# Patient Record
Sex: FEMALE | Race: WHITE | NOT HISPANIC OR LATINO | URBAN - METROPOLITAN AREA
[De-identification: names, ages, dates, MRNs, and addresses within clinical notes are randomized per-mention and may not be internally consistent; named-entity substitution may affect disease eponyms.]

---

## 2020-07-23 ENCOUNTER — OFFICE VISIT (OUTPATIENT)
Dept: URBAN - METROPOLITAN AREA CLINIC 72 | Facility: CLINIC | Age: 70
End: 2020-07-23

## 2020-07-25 ENCOUNTER — TELEPHONE ENCOUNTER (OUTPATIENT)
Dept: URBAN - METROPOLITAN AREA CLINIC 13 | Facility: CLINIC | Age: 70
End: 2020-07-25

## 2020-07-25 RX ORDER — CHOLECALCIFEROL (VITAMIN D3) 125 MCG
TAKE 1 TABLET DAILY TABLET ORAL
Refills: 0 | OUTPATIENT
End: 2020-04-16

## 2020-07-25 RX ORDER — VALSARTAN AND HYDROCHLOROTHIAZIDE 160; 25 MG/1; MG/1
TAKE 1 TABLET DAILY TABLET, FILM COATED ORAL
Refills: 0 | OUTPATIENT
End: 2020-04-16

## 2020-07-25 RX ORDER — CALCIUM CITRATE/VITAMIN D3 315MG-6.25
TAKE 1 TABLET DAILY TABLET ORAL
Refills: 0 | OUTPATIENT
Start: 2006-02-10 | End: 2008-12-01

## 2020-07-25 RX ORDER — BIFIDOBACTERIUM LONGUM 10MM CELL
USE AS DIRECTED CAPSULE ORAL
Refills: 0 | OUTPATIENT
Start: 2009-08-03 | End: 2011-05-02

## 2020-07-25 RX ORDER — FLUCONAZOLE 150 MG/1
ONE TABLET TODAY; REPEAT IN 3 DAYS TABLET ORAL
Qty: 2 | Refills: 0 | OUTPATIENT
Start: 2017-12-06 | End: 2019-05-06

## 2020-07-25 RX ORDER — HYDROCHLOROTHIAZIDE 12.5 MG/1
TAKE 1 CAPSULE DAILY CAPSULE ORAL
Refills: 0 | OUTPATIENT
End: 2017-03-06

## 2020-07-25 RX ORDER — PAROXETINE HYDROCHLORIDE 37.5 MG/1
TAKE 1 TABLET DAILY TABLET, FILM COATED, EXTENDED RELEASE ORAL
Refills: 0 | OUTPATIENT
End: 2019-03-06

## 2020-07-25 RX ORDER — BIOTIN 2500 MCG
TAKE 1 CAPSULE DAILY CAPSULE ORAL
Refills: 0 | OUTPATIENT
End: 2017-06-15

## 2020-07-25 RX ORDER — VALSARTAN 80 MG/1
TAKE ONE TABLET BY MOUTH ONE TIME DAILY TABLET, FILM COATED ORAL
Qty: 30 | Refills: 0 | OUTPATIENT
Start: 2019-09-24 | End: 2020-04-16

## 2020-07-25 RX ORDER — METRONIDAZOLE 500 MG/1
TAKE 1 TABLET 3 TIMES DAILY TABLET ORAL
Qty: 21 | Refills: 0 | OUTPATIENT
Start: 2017-11-15 | End: 2018-07-17

## 2020-07-25 RX ORDER — PANTOPRAZOLE SODIUM 40 MG/1
TAKE ONE TABLET BY MOUTH EVERY DAY TABLET, DELAYED RELEASE ORAL
Qty: 90 | Refills: 3 | OUTPATIENT
Start: 2016-01-04 | End: 2017-03-06

## 2020-07-25 RX ORDER — PANTOPRAZOLE SODIUM 40 MG/1
TAKE 1 TABLET 30 MINUTES BEFORE BREAKFAST DAILY TABLET, DELAYED RELEASE ORAL
Qty: 30 | Refills: 6 | OUTPATIENT
Start: 2015-10-28 | End: 2016-07-26

## 2020-07-25 RX ORDER — POLYETHYLENE GLYCOL 3350, SODIUM CHLORIDE, SODIUM BICARBONATE AND POTASSIUM CHLORIDE WITH LEMON FLAVOR 420; 11.2; 5.72; 1.48 G/4L; G/4L; G/4L; G/4L
TAKE  ML AS DIRECTED MIX CONTENTS PER DIRECTIONS, BEGIN DRINKING 1/2 SOLUTION @ 5P, COMPLETE REMAINDER OF SOLUTION 6HR PRIOR TO PROCEDURE POWDER, FOR SOLUTION ORAL
Qty: 1 | Refills: 0 | OUTPATIENT
Start: 2014-08-29 | End: 2014-10-03

## 2020-07-25 RX ORDER — CYANOCOBALAMIN (VITAMIN B-12) 500 MCG
TAKE 1 TABLET TWICE DAILY LOZENGE ORAL
Qty: 60 | Refills: 5 | OUTPATIENT
Start: 2017-06-16 | End: 2020-04-16

## 2020-07-25 RX ORDER — CIPROFLOXACIN HYDROCHLORIDE 500 MG/1
TAKE 1 TABLET TWICE DAILY TABLET, FILM COATED ORAL
Qty: 14 | Refills: 0 | OUTPATIENT
Start: 2017-11-15 | End: 2017-12-06

## 2020-07-26 ENCOUNTER — TELEPHONE ENCOUNTER (OUTPATIENT)
Dept: URBAN - METROPOLITAN AREA CLINIC 13 | Facility: CLINIC | Age: 70
End: 2020-07-26

## 2020-07-26 RX ORDER — ACETAMINOPHEN 325 MG
TAKE 1 CAPSULE DAILY TABLET ORAL
Refills: 0 | Status: ACTIVE | COMMUNITY

## 2020-07-26 RX ORDER — MULTIVITAMIN WITH IRON
TAKE 1 TABLET DAILY TABLET ORAL
Refills: 0 | Status: ACTIVE | COMMUNITY

## 2020-07-26 RX ORDER — PAROXETINE HYDROCHLORIDE HEMIHYDRATE 20 MG/1
TAKE ONE TABLET BY MOUTH EVERY MORNING TABLET, FILM COATED ORAL
Qty: 90 | Refills: 0 | Status: ACTIVE | COMMUNITY
Start: 2019-02-28

## 2020-07-26 RX ORDER — HYDROCHLOROTHIAZIDE 50 MG/1
TAKE 1 TABLET DAILY TABLET ORAL
Refills: 0 | Status: ACTIVE | COMMUNITY
Start: 2019-03-15

## 2020-07-26 RX ORDER — PAROXETINE HYDROCHLORIDE 40 MG/1
TAKE ONE TABLET BY MOUTH ONE TIME DAILY TABLET, FILM COATED ORAL
Qty: 90 | Refills: 0 | Status: ACTIVE | COMMUNITY
Start: 2019-03-14

## 2020-07-26 RX ORDER — TRIAMCINOLONE ACETONIDE 1 MG/G
APPLY TO THE AFFECTED AREA(S) TOPICALLY TWICE DAILY CREAM TOPICAL
Qty: 45 | Refills: 0 | Status: ACTIVE | COMMUNITY
Start: 2019-04-11

## 2020-07-26 RX ORDER — PANTOPRAZOLE SODIUM 40 MG/1
TAKE 1 TABLET BY MOUTH EVERY DAY TABLET, DELAYED RELEASE ORAL
Qty: 90 | Refills: 3 | Status: ACTIVE | COMMUNITY
Start: 2016-07-26

## 2020-07-26 RX ORDER — MELOXICAM 15 MG/1
TABLET ORAL
Qty: 90 | Refills: 0 | Status: ACTIVE | COMMUNITY
Start: 2020-07-10

## 2020-07-26 RX ORDER — AZITHROMYCIN DIHYDRATE 250 MG/1
TABLET, FILM COATED ORAL
Qty: 6 | Refills: 0 | Status: ACTIVE | COMMUNITY
Start: 2014-08-18

## 2020-07-26 RX ORDER — LEVOTHYROXINE SODIUM 25 UG/1
TAKE 1 TABLET DAILY TABLET ORAL
Refills: 0 | Status: ACTIVE | COMMUNITY
Start: 2019-03-15

## 2020-07-26 RX ORDER — CIPROFLOXACIN AND DEXAMETHASONE 3; 1 MG/ML; MG/ML
SUSPENSION/ DROPS AURICULAR (OTIC)
Qty: 8 | Refills: 0 | Status: ACTIVE | COMMUNITY
Start: 2019-07-25

## 2020-07-26 RX ORDER — BIFIDOBACTERIUM LONGUM 10MM CELL
USE AS DIRECTED CAPSULE ORAL
Refills: 0 | Status: ACTIVE | COMMUNITY

## 2020-07-26 RX ORDER — PAROXETINE HYDROCHLORIDE 30 MG/1
TABLET ORAL
Qty: 90 | Refills: 0 | Status: ACTIVE | COMMUNITY
Start: 2020-03-24

## 2020-07-26 RX ORDER — AMOXICILLIN 500 MG/1
TAKE TWO CAPSULES BY MOUTH NOW, THEN TAKE ONE CAPSULE BY MOUTH THREE T CAPSULE ORAL
Qty: 30 | Refills: 0 | Status: ACTIVE | COMMUNITY
Start: 2019-11-04

## 2020-07-26 RX ORDER — METOPROLOL SUCCINATE 25 MG/1
TAKE 1 TABLET ONCE DAILY TABLET, EXTENDED RELEASE ORAL
Refills: 0 | Status: ACTIVE | COMMUNITY

## 2020-07-26 RX ORDER — AMOXICILLIN 875 MG/1
TAKE ONE TABLET BY MOUTH TWICE A DAY UNTIL GONE TABLET, FILM COATED ORAL
Qty: 14 | Refills: 0 | Status: ACTIVE | COMMUNITY
Start: 2018-09-26

## 2020-07-26 RX ORDER — MUPIROCIN 20 MG/G
APPLY TO THE AFFECTED AREA(S) OF EROSIONS TOPICALLY TWICE DAILY UNTIL OINTMENT TOPICAL
Qty: 22 | Refills: 0 | Status: ACTIVE | COMMUNITY
Start: 2019-08-08

## 2020-07-26 RX ORDER — ETODOLAC 400 MG/1
TAKE ONE TABLET BY MOUTH THREE TIMES A DAY AS NEEDED FOR PAIN TABLET, COATED ORAL
Qty: 12 | Refills: 0 | Status: ACTIVE | COMMUNITY
Start: 2019-11-04

## 2020-07-26 RX ORDER — PRAVASTATIN SODIUM 20 MG/1
TAKE 1 TABLET DAILY TABLET ORAL
Refills: 0 | Status: ACTIVE | COMMUNITY

## 2020-07-26 RX ORDER — CIPROFLOXACIN AND DEXAMETHASONE 3; 1 MG/ML; MG/ML
INSTILL 4 DROPS IN THE LEFT EAR TWICE A DAY FOR 7 DAYS SUSPENSION/ DROPS AURICULAR (OTIC)
Qty: 8 | Refills: 0 | Status: ACTIVE | COMMUNITY
Start: 2019-07-24

## 2020-07-26 RX ORDER — PAROXETINE HYDROCHLORIDE 30 MG/1
TAKE ONE TABLET BY MOUTH EVERY MORNING TABLET ORAL
Qty: 90 | Refills: 0 | Status: ACTIVE | COMMUNITY
Start: 2019-09-24

## 2020-07-26 RX ORDER — CALCIUM CITRATE/VITAMIN D3 315MG-6.25
TABLET ORAL
Refills: 0 | Status: ACTIVE | COMMUNITY

## 2020-07-26 RX ORDER — ALPRAZOLAM 2 MG/1
TAKE 1 TABLET DAILY PRN TABLET ORAL
Refills: 0 | Status: ACTIVE | COMMUNITY
Start: 2006-02-10

## 2020-07-26 RX ORDER — DOXYCYCLINE 100 MG/1
TAKE ONE TABLET BY MOUTH TWICE A DAY FOR 12 DAYS TABLET, FILM COATED ORAL
Qty: 24 | Refills: 0 | Status: ACTIVE | COMMUNITY
Start: 2019-08-08

## 2020-07-26 RX ORDER — PAROXETINE HYDROCHLORIDE 40 MG/1
TABLET, FILM COATED ORAL
Qty: 90 | Refills: 0 | Status: ACTIVE | COMMUNITY
Start: 2019-05-07

## 2020-08-25 ENCOUNTER — OFFICE VISIT (OUTPATIENT)
Dept: URBAN - METROPOLITAN AREA CLINIC 72 | Facility: CLINIC | Age: 70
End: 2020-08-25
Payer: MEDICARE

## 2020-08-25 ENCOUNTER — WEB ENCOUNTER (OUTPATIENT)
Dept: URBAN - METROPOLITAN AREA CLINIC 72 | Facility: CLINIC | Age: 70
End: 2020-08-25

## 2020-08-25 VITALS
WEIGHT: 174 LBS | HEART RATE: 73 BPM | BODY MASS INDEX: 30.83 KG/M2 | HEIGHT: 63 IN | TEMPERATURE: 98.4 F | DIASTOLIC BLOOD PRESSURE: 86 MMHG | SYSTOLIC BLOOD PRESSURE: 141 MMHG

## 2020-08-25 DIAGNOSIS — Z86.010 HISTORY OF COLON POLYPS: ICD-10-CM

## 2020-08-25 DIAGNOSIS — K22.70 BARRETT'S ESOPHAGUS WITHOUT DYSPLASIA: ICD-10-CM

## 2020-08-25 DIAGNOSIS — K64.2 GRADE III HEMORRHOIDS: ICD-10-CM

## 2020-08-25 PROCEDURE — G8938 BMI DOC ONL FUP NT DOC: HCPCS | Performed by: INTERNAL MEDICINE

## 2020-08-25 PROCEDURE — 99213 OFFICE O/P EST LOW 20 MIN: CPT | Performed by: INTERNAL MEDICINE

## 2020-08-25 PROCEDURE — 3017F COLORECTAL CA SCREEN DOC REV: CPT | Performed by: INTERNAL MEDICINE

## 2020-08-25 PROCEDURE — 1036F TOBACCO NON-USER: CPT | Performed by: INTERNAL MEDICINE

## 2020-08-25 PROCEDURE — G8427 DOCREV CUR MEDS BY ELIG CLIN: HCPCS | Performed by: INTERNAL MEDICINE

## 2020-08-25 RX ORDER — MULTIVITAMIN WITH IRON
TAKE 1 TABLET DAILY TABLET ORAL
Refills: 0 | Status: ACTIVE | COMMUNITY

## 2020-08-25 RX ORDER — PAROXETINE HYDROCHLORIDE 40 MG/1
TAKE ONE TABLET BY MOUTH ONE TIME DAILY TABLET, FILM COATED ORAL
Qty: 90 | Refills: 0 | Status: ON HOLD | COMMUNITY
Start: 2019-03-14

## 2020-08-25 RX ORDER — AMOXICILLIN 875 MG/1
TAKE ONE TABLET BY MOUTH TWICE A DAY UNTIL GONE TABLET, FILM COATED ORAL
Qty: 14 | Refills: 0 | Status: ON HOLD | COMMUNITY
Start: 2018-09-26

## 2020-08-25 RX ORDER — PAROXETINE HYDROCHLORIDE 30 MG/1
TAKE ONE TABLET BY MOUTH EVERY MORNING TABLET ORAL
Qty: 90 | Refills: 0 | Status: ACTIVE | COMMUNITY
Start: 2019-09-24

## 2020-08-25 RX ORDER — ETODOLAC 400 MG/1
TAKE ONE TABLET BY MOUTH THREE TIMES A DAY AS NEEDED FOR PAIN TABLET, COATED ORAL
Qty: 12 | Refills: 0 | Status: ON HOLD | COMMUNITY
Start: 2019-11-04

## 2020-08-25 RX ORDER — MELOXICAM 15 MG/1
TABLET ORAL
Qty: 90 | Refills: 0 | Status: ACTIVE | COMMUNITY
Start: 2020-07-10

## 2020-08-25 RX ORDER — CALCIUM CITRATE/VITAMIN D3 315MG-6.25
TABLET ORAL
Refills: 0 | Status: ACTIVE | COMMUNITY

## 2020-08-25 RX ORDER — LEVOTHYROXINE SODIUM 25 UG/1
TAKE 1 TABLET DAILY TABLET ORAL
Refills: 0 | Status: ACTIVE | COMMUNITY
Start: 2019-03-15

## 2020-08-25 RX ORDER — ACETAMINOPHEN 325 MG
TAKE 1 CAPSULE DAILY TABLET ORAL
Refills: 0 | Status: ACTIVE | COMMUNITY

## 2020-08-25 RX ORDER — PANTOPRAZOLE SODIUM 40 MG/1
TAKE 1 TABLET BY MOUTH EVERY DAY TABLET, DELAYED RELEASE ORAL
Qty: 90 | Refills: 3 | Status: ACTIVE | COMMUNITY
Start: 2016-07-26

## 2020-08-25 RX ORDER — PAROXETINE HYDROCHLORIDE HEMIHYDRATE 20 MG/1
TAKE ONE TABLET BY MOUTH EVERY MORNING TABLET, FILM COATED ORAL
Qty: 90 | Refills: 0 | Status: ON HOLD | COMMUNITY
Start: 2019-02-28

## 2020-08-25 RX ORDER — DOXYCYCLINE 100 MG/1
TAKE ONE TABLET BY MOUTH TWICE A DAY FOR 12 DAYS TABLET, FILM COATED ORAL
Qty: 24 | Refills: 0 | Status: ON HOLD | COMMUNITY
Start: 2019-08-08

## 2020-08-25 RX ORDER — TRIAMCINOLONE ACETONIDE 1 MG/G
APPLY TO THE AFFECTED AREA(S) TOPICALLY TWICE DAILY CREAM TOPICAL
Qty: 45 | Refills: 0 | Status: ON HOLD | COMMUNITY
Start: 2019-04-11

## 2020-08-25 RX ORDER — METOPROLOL SUCCINATE 25 MG/1
TAKE 1 TABLET ONCE DAILY TABLET, EXTENDED RELEASE ORAL
Refills: 0 | Status: ACTIVE | COMMUNITY

## 2020-08-25 RX ORDER — HYDROCHLOROTHIAZIDE 50 MG/1
TAKE 1 TABLET DAILY TABLET ORAL
Refills: 0 | Status: ACTIVE | COMMUNITY
Start: 2019-03-15

## 2020-08-25 RX ORDER — AMOXICILLIN 500 MG/1
TAKE TWO CAPSULES BY MOUTH NOW, THEN TAKE ONE CAPSULE BY MOUTH THREE T CAPSULE ORAL
Qty: 30 | Refills: 0 | Status: ON HOLD | COMMUNITY
Start: 2019-11-04

## 2020-08-25 RX ORDER — CIPROFLOXACIN AND DEXAMETHASONE 3; 1 MG/ML; MG/ML
INSTILL 4 DROPS IN THE LEFT EAR TWICE A DAY FOR 7 DAYS SUSPENSION/ DROPS AURICULAR (OTIC)
Qty: 8 | Refills: 0 | Status: ACTIVE | COMMUNITY
Start: 2019-07-24

## 2020-08-25 RX ORDER — AZITHROMYCIN DIHYDRATE 250 MG/1
TABLET, FILM COATED ORAL
Qty: 6 | Refills: 0 | Status: ON HOLD | COMMUNITY
Start: 2014-08-18

## 2020-08-25 RX ORDER — MUPIROCIN 20 MG/G
APPLY TO THE AFFECTED AREA(S) OF EROSIONS TOPICALLY TWICE DAILY UNTIL OINTMENT TOPICAL
Qty: 22 | Refills: 0 | Status: ACTIVE | COMMUNITY
Start: 2019-08-08

## 2020-08-25 RX ORDER — PRAVASTATIN SODIUM 20 MG/1
TAKE 1 TABLET DAILY TABLET ORAL
Refills: 0 | Status: ACTIVE | COMMUNITY

## 2020-08-25 RX ORDER — BIFIDOBACTERIUM LONGUM 10MM CELL
USE AS DIRECTED CAPSULE ORAL
Refills: 0 | Status: ON HOLD | COMMUNITY

## 2020-08-25 RX ORDER — ALPRAZOLAM 2 MG/1
TAKE 1 TABLET DAILY PRN TABLET ORAL
Refills: 0 | Status: ACTIVE | COMMUNITY
Start: 2006-02-10

## 2020-08-25 NOTE — PHYSICAL EXAM GASTROINTESTINAL
Abdomen , soft, nontender, nondistended , no guarding or rigidity , no masses palpable , normal bowel sounds , Liver and Spleen , no hepatomegaly present , no hepatosplenomegaly , liver nontender , spleen not palpable perianal exam with reabsorbing external hemorrhoid and numerous skin tags, no obvious bleeding. Claudia Lucia MA was present as assistant.

## 2020-08-25 NOTE — HPI-TODAY'S VISIT:
Ms. Mcguire returns for follow-up.  Recall that she was last seen in our office on 7/23/2020.  She underwent hemorrhoid band ligation, she had her left lateral hemorrhoid banded, she had undergone banding of the other hemorrhoids previously.  She will be due for EGD and colonoscopy in October due to her history of Squires's esophagus and colon polyps.  We advised conservative management after banding with suppositories as needed and increasing her dietary fiber. She has been doing well since we last banded her but notes that she feels an external hemorrhoid has come up, recall that at last visit she reported that she had a small external hemorrhoid that was nonpainful, she has been using over-the-counter treatment including Desitin and Tucks pads to the area but feels that it has enlarged.

## 2021-02-02 ENCOUNTER — TELEPHONE ENCOUNTER (OUTPATIENT)
Dept: URBAN - METROPOLITAN AREA CLINIC 113 | Facility: CLINIC | Age: 71
End: 2021-02-02

## 2021-02-09 ENCOUNTER — OFFICE VISIT (OUTPATIENT)
Dept: URBAN - METROPOLITAN AREA CLINIC 107 | Facility: CLINIC | Age: 71
End: 2021-02-09

## 2021-02-09 NOTE — HPI-TODAY'S VISIT:
71-year-old with history gastroesophageal reflux disease, nondysplastic short segment Squires's esophagus, functional dyspepsia, abnormal liver function tests secondary to nonalcoholic fatty liver disease, chronic constipation.  Last EGD on 10/31/2017 revealed Squires's esophagus one island from 37-38 cm, some patchy erythema the gastric antrum, multiple 3-6 mm sessile polyps in the gastric body and normal duodenum.  Biopsies revealed squamocolumnar mucosa with reflux type changes without intestinal metaplasia or dysplasia, polypoid foveolar hyperplasia, no H. pylori.  Last colonoscopy was on 11/15/2017 that revealed a normal terminal ileum, grade 2 internal hemorrhoids, many small and large mouth diverticuli of the left and transverse colon.  There were 2 polyps in the ascending colon each 3 mm in size and a 6 mm polyp in the descending colon.  There is localized area of moderate erythema in the sigmoid colon biopsies were obtained.  There is a small amount of possible this was nodular and erythematous and adjacent to a diverticulum.  There was a single large diverticulum found in the sigmoid colon with shahid-diverticula erythema, discharge was seen in association with the diverticular opening suspicion for diverticulitis.  The ascending colon polyps were tubular adenomas and the sigmoid colon biopsy was diverticular disease associated with colitis.  She has subsequently had hemorrhoidal banding by Dr. Díaz.

## 2021-02-10 ENCOUNTER — OFFICE VISIT (OUTPATIENT)
Dept: URBAN - METROPOLITAN AREA CLINIC 113 | Facility: CLINIC | Age: 71
End: 2021-02-10
Payer: MEDICARE

## 2021-02-10 ENCOUNTER — LAB OUTSIDE AN ENCOUNTER (OUTPATIENT)
Dept: URBAN - METROPOLITAN AREA CLINIC 113 | Facility: CLINIC | Age: 71
End: 2021-02-10

## 2021-02-10 VITALS
BODY MASS INDEX: 30.12 KG/M2 | SYSTOLIC BLOOD PRESSURE: 119 MMHG | TEMPERATURE: 97.8 F | HEART RATE: 85 BPM | DIASTOLIC BLOOD PRESSURE: 80 MMHG | HEIGHT: 63 IN | WEIGHT: 170 LBS

## 2021-02-10 DIAGNOSIS — K22.70 BARRETT'S ESOPHAGUS WITHOUT DYSPLASIA: ICD-10-CM

## 2021-02-10 DIAGNOSIS — D12.6 TUBULAR ADENOMA OF COLON: ICD-10-CM

## 2021-02-10 PROCEDURE — 99213 OFFICE O/P EST LOW 20 MIN: CPT | Performed by: INTERNAL MEDICINE

## 2021-02-10 PROCEDURE — G8482 FLU IMMUNIZE ORDER/ADMIN: HCPCS | Performed by: INTERNAL MEDICINE

## 2021-02-10 PROCEDURE — G9903 PT SCRN TBCO ID AS NON USER: HCPCS | Performed by: INTERNAL MEDICINE

## 2021-02-10 PROCEDURE — 3017F COLORECTAL CA SCREEN DOC REV: CPT | Performed by: INTERNAL MEDICINE

## 2021-02-10 PROCEDURE — G8427 DOCREV CUR MEDS BY ELIG CLIN: HCPCS | Performed by: INTERNAL MEDICINE

## 2021-02-10 RX ORDER — PAROXETINE HYDROCHLORIDE HEMIHYDRATE 20 MG/1
TAKE ONE TABLET BY MOUTH EVERY MORNING TABLET, FILM COATED ORAL
Qty: 90 | Refills: 0 | Status: ON HOLD | COMMUNITY
Start: 2019-02-28

## 2021-02-10 RX ORDER — ETODOLAC 400 MG/1
TAKE ONE TABLET BY MOUTH THREE TIMES A DAY AS NEEDED FOR PAIN TABLET, COATED ORAL
Qty: 12 | Refills: 0 | Status: ON HOLD | COMMUNITY
Start: 2019-11-04

## 2021-02-10 RX ORDER — ACETAMINOPHEN 325 MG
TAKE 1 CAPSULE DAILY TABLET ORAL
Refills: 0 | Status: DISCONTINUED | COMMUNITY

## 2021-02-10 RX ORDER — ALPRAZOLAM 2 MG/1
TAKE 1 TABLET DAILY PRN TABLET ORAL
Refills: 0 | Status: ACTIVE | COMMUNITY
Start: 2006-02-10

## 2021-02-10 RX ORDER — DOXYCYCLINE 100 MG/1
TAKE ONE TABLET BY MOUTH TWICE A DAY FOR 12 DAYS TABLET, FILM COATED ORAL
Qty: 24 | Refills: 0 | Status: ON HOLD | COMMUNITY
Start: 2019-08-08

## 2021-02-10 RX ORDER — MUPIROCIN 20 MG/G
APPLY TO THE AFFECTED AREA(S) OF EROSIONS TOPICALLY TWICE DAILY UNTIL OINTMENT TOPICAL
Qty: 22 | Refills: 0 | Status: ON HOLD | COMMUNITY
Start: 2019-08-08

## 2021-02-10 RX ORDER — VALSARTAN 80 MG/1
1 TABLET TABLET, FILM COATED ORAL ONCE A DAY
Status: ACTIVE | COMMUNITY

## 2021-02-10 RX ORDER — TRIAMCINOLONE ACETONIDE 1 MG/G
APPLY TO THE AFFECTED AREA(S) TOPICALLY TWICE DAILY CREAM TOPICAL
Qty: 45 | Refills: 0 | Status: ON HOLD | COMMUNITY
Start: 2019-04-11

## 2021-02-10 RX ORDER — PANTOPRAZOLE SODIUM 40 MG/1
TAKE 1 TABLET BY MOUTH EVERY DAY TABLET, DELAYED RELEASE ORAL
Qty: 90 | Refills: 3 | Status: ACTIVE | COMMUNITY
Start: 2016-07-26

## 2021-02-10 RX ORDER — PRAVASTATIN SODIUM 20 MG/1
TAKE 1 TABLET DAILY TABLET ORAL
Refills: 0 | Status: ON HOLD | COMMUNITY

## 2021-02-10 RX ORDER — MELOXICAM 15 MG/1
TABLET ORAL
Qty: 90 | Refills: 0 | Status: ON HOLD | COMMUNITY
Start: 2020-07-10

## 2021-02-10 RX ORDER — BIFIDOBACTERIUM LONGUM 10MM CELL
USE AS DIRECTED CAPSULE ORAL
Refills: 0 | Status: ON HOLD | COMMUNITY

## 2021-02-10 RX ORDER — LEVOTHYROXINE SODIUM 25 UG/1
TAKE 1 TABLET DAILY TABLET ORAL
Refills: 0 | Status: ACTIVE | COMMUNITY
Start: 2019-03-15

## 2021-02-10 RX ORDER — MULTIVITAMIN WITH IRON
TAKE 1 TABLET DAILY TABLET ORAL
Refills: 0 | Status: ACTIVE | COMMUNITY

## 2021-02-10 RX ORDER — CALCIUM CITRATE/VITAMIN D3 315MG-6.25
TABLET ORAL
Refills: 0 | Status: ON HOLD | COMMUNITY

## 2021-02-10 RX ORDER — HYDROCHLOROTHIAZIDE 50 MG/1
TAKE 1 TABLET DAILY TABLET ORAL
Refills: 0 | Status: ACTIVE | COMMUNITY
Start: 2019-03-15

## 2021-02-10 RX ORDER — AMOXICILLIN 875 MG/1
TAKE ONE TABLET BY MOUTH TWICE A DAY UNTIL GONE TABLET, FILM COATED ORAL
Qty: 14 | Refills: 0 | Status: ON HOLD | COMMUNITY
Start: 2018-09-26

## 2021-02-10 RX ORDER — CIPROFLOXACIN AND DEXAMETHASONE 3; 1 MG/ML; MG/ML
INSTILL 4 DROPS IN THE LEFT EAR TWICE A DAY FOR 7 DAYS SUSPENSION/ DROPS AURICULAR (OTIC)
Qty: 8 | Refills: 0 | Status: ON HOLD | COMMUNITY
Start: 2019-07-24

## 2021-02-10 RX ORDER — PAROXETINE HYDROCHLORIDE 30 MG/1
TAKE ONE TABLET BY MOUTH EVERY MORNING TABLET ORAL
Qty: 90 | Refills: 0 | Status: ACTIVE | COMMUNITY
Start: 2019-09-24

## 2021-02-10 RX ORDER — AMOXICILLIN 500 MG/1
TAKE TWO CAPSULES BY MOUTH NOW, THEN TAKE ONE CAPSULE BY MOUTH THREE T CAPSULE ORAL
Qty: 30 | Refills: 0 | Status: ON HOLD | COMMUNITY
Start: 2019-11-04

## 2021-02-10 RX ORDER — PAROXETINE HYDROCHLORIDE 40 MG/1
TAKE ONE TABLET BY MOUTH ONE TIME DAILY TABLET, FILM COATED ORAL
Qty: 90 | Refills: 0 | Status: ON HOLD | COMMUNITY
Start: 2019-03-14

## 2021-02-10 RX ORDER — EZETIMIBE 10 MG/1
1 TABLET TABLET ORAL ONCE A DAY
Status: ACTIVE | COMMUNITY

## 2021-02-10 RX ORDER — METOPROLOL SUCCINATE 25 MG/1
TAKE 1 TABLET ONCE DAILY TABLET, EXTENDED RELEASE ORAL
Refills: 0 | Status: ON HOLD | COMMUNITY

## 2021-02-10 RX ORDER — AZITHROMYCIN DIHYDRATE 250 MG/1
TABLET, FILM COATED ORAL
Qty: 6 | Refills: 0 | Status: ON HOLD | COMMUNITY
Start: 2014-08-18

## 2021-02-10 NOTE — HPI-TODAY'S VISIT:
71-year-old with history gastroesophageal reflux disease, nondysplastic short segment Squires's esophagus, functional dyspepsia, abnormal liver function tests secondary to nonalcoholic fatty liver disease, chronic constipation.  Last EGD on 10/31/2017 revealed Squires's esophagus one island from 37-38 cm, some patchy erythema the gastric antrum, multiple 3-6 mm sessile polyps in the gastric body and normal duodenum.  Biopsies revealed squamocolumnar mucosa with reflux type changes without intestinal metaplasia or dysplasia, polypoid foveolar hyperplasia, no H. pylori.  Last colonoscopy was on 11/15/2017 that revealed a normal terminal ileum, grade 2 internal hemorrhoids, many small and large mouth diverticuli of the left and transverse colon.  There were 2 polyps in the ascending colon each 3 mm in size and a 6 mm polyp in the descending colon.  There is localized area of moderate erythema in the sigmoid colon biopsies were obtained.  There is a small amount of possible this was nodular and erythematous and adjacent to a diverticulum.  There was a single large diverticulum found in the sigmoid colon with shahid-diverticula erythema, discharge was seen in association with the diverticular opening suspicion for diverticulitis.  The ascending colon polyps were tubular adenomas and the sigmoid colon biopsy was diverticular disease associated with colitis.  She has subsequently had hemorrhoidal banding by Dr. Díaz. She is doing well.  She rarely will get heartburn symptoms/regurgitation.  There is no dysphagia.  She sometimes gets some lower abdominal cramping however if she takes her Metamucil on a regular basis she does not have that.  Occasionally she has some upper abdominal discomfort as well but that's infrequent she states.  This been no weight loss.  There is no fevers or chills.  There is no nausea or vomiting.  If she uses her Metamucil she'll have fairly regular bowel movements.  There's been no blood or melena.

## 2021-02-23 ENCOUNTER — OFFICE VISIT (OUTPATIENT)
Dept: URBAN - METROPOLITAN AREA SURGERY CENTER 25 | Facility: SURGERY CENTER | Age: 71
End: 2021-02-23
Payer: MEDICARE

## 2021-02-23 ENCOUNTER — CLAIMS CREATED FROM THE CLAIM WINDOW (OUTPATIENT)
Dept: URBAN - METROPOLITAN AREA CLINIC 4 | Facility: CLINIC | Age: 71
End: 2021-02-23
Payer: MEDICARE

## 2021-02-23 DIAGNOSIS — K31.7 BENIGN GASTRIC POLYP: ICD-10-CM

## 2021-02-23 DIAGNOSIS — K22.70 BARRETT ESOPHAGUS: ICD-10-CM

## 2021-02-23 DIAGNOSIS — K31.7 POLYP OF STOMACH AND DUODENUM: ICD-10-CM

## 2021-02-23 DIAGNOSIS — K21.00 GASTRO-ESOPHAGEAL REFLUX DISEASE WITH ESOPHAGITIS, WITHOUT BLEEDING: ICD-10-CM

## 2021-02-23 DIAGNOSIS — K21.9 ACID REFLUX: ICD-10-CM

## 2021-02-23 PROCEDURE — 43239 EGD BIOPSY SINGLE/MULTIPLE: CPT | Performed by: INTERNAL MEDICINE

## 2021-02-23 PROCEDURE — 88305 TISSUE EXAM BY PATHOLOGIST: CPT | Performed by: PATHOLOGY

## 2021-02-23 PROCEDURE — 88312 SPECIAL STAINS GROUP 1: CPT | Performed by: PATHOLOGY

## 2021-02-23 PROCEDURE — G8907 PT DOC NO EVENTS ON DISCHARG: HCPCS | Performed by: INTERNAL MEDICINE

## 2021-02-23 RX ORDER — AMOXICILLIN 500 MG/1
TAKE TWO CAPSULES BY MOUTH NOW, THEN TAKE ONE CAPSULE BY MOUTH THREE T CAPSULE ORAL
Qty: 30 | Refills: 0 | Status: ON HOLD | COMMUNITY
Start: 2019-11-04

## 2021-02-23 RX ORDER — PAROXETINE HYDROCHLORIDE 40 MG/1
TAKE ONE TABLET BY MOUTH ONE TIME DAILY TABLET, FILM COATED ORAL
Qty: 90 | Refills: 0 | Status: ON HOLD | COMMUNITY
Start: 2019-03-14

## 2021-02-23 RX ORDER — PAROXETINE HYDROCHLORIDE HEMIHYDRATE 20 MG/1
TAKE ONE TABLET BY MOUTH EVERY MORNING TABLET, FILM COATED ORAL
Qty: 90 | Refills: 0 | Status: ON HOLD | COMMUNITY
Start: 2019-02-28

## 2021-02-23 RX ORDER — AMOXICILLIN 875 MG/1
TAKE ONE TABLET BY MOUTH TWICE A DAY UNTIL GONE TABLET, FILM COATED ORAL
Qty: 14 | Refills: 0 | Status: ON HOLD | COMMUNITY
Start: 2018-09-26

## 2021-02-23 RX ORDER — LEVOTHYROXINE SODIUM 25 UG/1
TAKE 1 TABLET DAILY TABLET ORAL
Refills: 0 | Status: ACTIVE | COMMUNITY
Start: 2019-03-15

## 2021-02-23 RX ORDER — VALSARTAN 80 MG/1
1 TABLET TABLET, FILM COATED ORAL ONCE A DAY
Status: ACTIVE | COMMUNITY

## 2021-02-23 RX ORDER — MUPIROCIN 20 MG/G
APPLY TO THE AFFECTED AREA(S) OF EROSIONS TOPICALLY TWICE DAILY UNTIL OINTMENT TOPICAL
Qty: 22 | Refills: 0 | Status: ON HOLD | COMMUNITY
Start: 2019-08-08

## 2021-02-23 RX ORDER — EZETIMIBE 10 MG/1
1 TABLET TABLET ORAL ONCE A DAY
Status: ACTIVE | COMMUNITY

## 2021-02-23 RX ORDER — PAROXETINE HYDROCHLORIDE 30 MG/1
TAKE ONE TABLET BY MOUTH EVERY MORNING TABLET ORAL
Qty: 90 | Refills: 0 | Status: ACTIVE | COMMUNITY
Start: 2019-09-24

## 2021-02-23 RX ORDER — METOPROLOL SUCCINATE 25 MG/1
TAKE 1 TABLET ONCE DAILY TABLET, EXTENDED RELEASE ORAL
Refills: 0 | Status: ON HOLD | COMMUNITY

## 2021-02-23 RX ORDER — MELOXICAM 15 MG/1
TABLET ORAL
Qty: 90 | Refills: 0 | Status: ON HOLD | COMMUNITY
Start: 2020-07-10

## 2021-02-23 RX ORDER — CIPROFLOXACIN AND DEXAMETHASONE 3; 1 MG/ML; MG/ML
INSTILL 4 DROPS IN THE LEFT EAR TWICE A DAY FOR 7 DAYS SUSPENSION/ DROPS AURICULAR (OTIC)
Qty: 8 | Refills: 0 | Status: ON HOLD | COMMUNITY
Start: 2019-07-24

## 2021-02-23 RX ORDER — ALPRAZOLAM 2 MG/1
TAKE 1 TABLET DAILY PRN TABLET ORAL
Refills: 0 | Status: ACTIVE | COMMUNITY
Start: 2006-02-10

## 2021-02-23 RX ORDER — CALCIUM CITRATE/VITAMIN D3 315MG-6.25
TABLET ORAL
Refills: 0 | Status: ON HOLD | COMMUNITY

## 2021-02-23 RX ORDER — PRAVASTATIN SODIUM 20 MG/1
TAKE 1 TABLET DAILY TABLET ORAL
Refills: 0 | Status: ON HOLD | COMMUNITY

## 2021-02-23 RX ORDER — MULTIVITAMIN WITH IRON
TAKE 1 TABLET DAILY TABLET ORAL
Refills: 0 | Status: ACTIVE | COMMUNITY

## 2021-02-23 RX ORDER — TRIAMCINOLONE ACETONIDE 1 MG/G
APPLY TO THE AFFECTED AREA(S) TOPICALLY TWICE DAILY CREAM TOPICAL
Qty: 45 | Refills: 0 | Status: ON HOLD | COMMUNITY
Start: 2019-04-11

## 2021-02-23 RX ORDER — HYDROCHLOROTHIAZIDE 50 MG/1
TAKE 1 TABLET DAILY TABLET ORAL
Refills: 0 | Status: ACTIVE | COMMUNITY
Start: 2019-03-15

## 2021-02-23 RX ORDER — AZITHROMYCIN DIHYDRATE 250 MG/1
TABLET, FILM COATED ORAL
Qty: 6 | Refills: 0 | Status: ON HOLD | COMMUNITY
Start: 2014-08-18

## 2021-02-23 RX ORDER — ETODOLAC 400 MG/1
TAKE ONE TABLET BY MOUTH THREE TIMES A DAY AS NEEDED FOR PAIN TABLET, COATED ORAL
Qty: 12 | Refills: 0 | Status: ON HOLD | COMMUNITY
Start: 2019-11-04

## 2021-02-23 RX ORDER — PANTOPRAZOLE SODIUM 40 MG/1
TAKE 1 TABLET BY MOUTH EVERY DAY TABLET, DELAYED RELEASE ORAL
Qty: 90 | Refills: 3 | Status: ACTIVE | COMMUNITY
Start: 2016-07-26

## 2021-02-23 RX ORDER — BIFIDOBACTERIUM LONGUM 10MM CELL
USE AS DIRECTED CAPSULE ORAL
Refills: 0 | Status: ON HOLD | COMMUNITY

## 2021-02-23 RX ORDER — DOXYCYCLINE 100 MG/1
TAKE ONE TABLET BY MOUTH TWICE A DAY FOR 12 DAYS TABLET, FILM COATED ORAL
Qty: 24 | Refills: 0 | Status: ON HOLD | COMMUNITY
Start: 2019-08-08

## 2021-04-01 PROBLEM — 78809005: Status: ACTIVE | Noted: 2021-04-01

## 2021-04-01 PROBLEM — 302914006: Status: ACTIVE | Noted: 2020-08-25

## 2021-04-02 ENCOUNTER — OFFICE VISIT (OUTPATIENT)
Dept: URBAN - METROPOLITAN AREA CLINIC 113 | Facility: CLINIC | Age: 71
End: 2021-04-02
Payer: MEDICARE

## 2021-04-02 ENCOUNTER — LAB OUTSIDE AN ENCOUNTER (OUTPATIENT)
Dept: URBAN - METROPOLITAN AREA CLINIC 113 | Facility: CLINIC | Age: 71
End: 2021-04-02

## 2021-04-02 ENCOUNTER — WEB ENCOUNTER (OUTPATIENT)
Dept: URBAN - METROPOLITAN AREA CLINIC 113 | Facility: CLINIC | Age: 71
End: 2021-04-02

## 2021-04-02 VITALS
BODY MASS INDEX: 30.3 KG/M2 | RESPIRATION RATE: 18 BRPM | HEART RATE: 73 BPM | WEIGHT: 171 LBS | SYSTOLIC BLOOD PRESSURE: 115 MMHG | HEIGHT: 63 IN | TEMPERATURE: 97.5 F | DIASTOLIC BLOOD PRESSURE: 81 MMHG

## 2021-04-02 DIAGNOSIS — D13.1 FUNDIC GLAND POLYPS OF STOMACH, BENIGN: ICD-10-CM

## 2021-04-02 DIAGNOSIS — D12.6 TUBULAR ADENOMA OF COLON: ICD-10-CM

## 2021-04-02 DIAGNOSIS — K22.70 BARRETT'S ESOPHAGUS WITHOUT DYSPLASIA: ICD-10-CM

## 2021-04-02 PROCEDURE — 99213 OFFICE O/P EST LOW 20 MIN: CPT | Performed by: INTERNAL MEDICINE

## 2021-04-02 RX ORDER — MUPIROCIN 20 MG/G
APPLY TO THE AFFECTED AREA(S) OF EROSIONS TOPICALLY TWICE DAILY UNTIL OINTMENT TOPICAL
Qty: 22 | Refills: 0 | Status: ON HOLD | COMMUNITY
Start: 2019-08-08

## 2021-04-02 RX ORDER — EZETIMIBE 10 MG/1
1 TABLET TABLET ORAL ONCE A DAY
Status: ACTIVE | COMMUNITY

## 2021-04-02 RX ORDER — ETODOLAC 400 MG/1
TAKE ONE TABLET BY MOUTH THREE TIMES A DAY AS NEEDED FOR PAIN TABLET, COATED ORAL
Qty: 12 | Refills: 0 | Status: ON HOLD | COMMUNITY
Start: 2019-11-04

## 2021-04-02 RX ORDER — DOXYCYCLINE 100 MG/1
TAKE ONE TABLET BY MOUTH TWICE A DAY FOR 12 DAYS TABLET, FILM COATED ORAL
Qty: 24 | Refills: 0 | Status: ON HOLD | COMMUNITY
Start: 2019-08-08

## 2021-04-02 RX ORDER — BIFIDOBACTERIUM LONGUM 10MM CELL
USE AS DIRECTED CAPSULE ORAL
Refills: 0 | Status: ON HOLD | COMMUNITY

## 2021-04-02 RX ORDER — CALCIUM CITRATE/VITAMIN D3 315MG-6.25
TABLET ORAL
Refills: 0 | Status: ON HOLD | COMMUNITY

## 2021-04-02 RX ORDER — AMOXICILLIN 500 MG/1
TAKE TWO CAPSULES BY MOUTH NOW, THEN TAKE ONE CAPSULE BY MOUTH THREE T CAPSULE ORAL
Qty: 30 | Refills: 0 | Status: ON HOLD | COMMUNITY
Start: 2019-11-04

## 2021-04-02 RX ORDER — METOPROLOL SUCCINATE 25 MG/1
TAKE 1 TABLET ONCE DAILY TABLET, EXTENDED RELEASE ORAL
Refills: 0 | Status: ON HOLD | COMMUNITY

## 2021-04-02 RX ORDER — PAROXETINE HYDROCHLORIDE 40 MG/1
TAKE ONE TABLET BY MOUTH ONE TIME DAILY TABLET, FILM COATED ORAL
Qty: 90 | Refills: 0 | Status: ON HOLD | COMMUNITY
Start: 2019-03-14

## 2021-04-02 RX ORDER — PRAVASTATIN SODIUM 20 MG/1
TAKE 1 TABLET DAILY TABLET ORAL
Refills: 0 | Status: ON HOLD | COMMUNITY

## 2021-04-02 RX ORDER — ALPRAZOLAM 2 MG/1
TAKE 1 TABLET DAILY PRN TABLET ORAL
Refills: 0 | Status: ACTIVE | COMMUNITY
Start: 2006-02-10

## 2021-04-02 RX ORDER — AZITHROMYCIN DIHYDRATE 250 MG/1
TABLET, FILM COATED ORAL
Qty: 6 | Refills: 0 | Status: ON HOLD | COMMUNITY
Start: 2014-08-18

## 2021-04-02 RX ORDER — HYDROCHLOROTHIAZIDE 50 MG/1
TAKE 1 TABLET DAILY TABLET ORAL
Refills: 0 | Status: ACTIVE | COMMUNITY
Start: 2019-03-15

## 2021-04-02 RX ORDER — CIPROFLOXACIN AND DEXAMETHASONE 3; 1 MG/ML; MG/ML
INSTILL 4 DROPS IN THE LEFT EAR TWICE A DAY FOR 7 DAYS SUSPENSION/ DROPS AURICULAR (OTIC)
Qty: 8 | Refills: 0 | Status: ON HOLD | COMMUNITY
Start: 2019-07-24

## 2021-04-02 RX ORDER — TRIAMCINOLONE ACETONIDE 1 MG/G
APPLY TO THE AFFECTED AREA(S) TOPICALLY TWICE DAILY CREAM TOPICAL
Qty: 45 | Refills: 0 | Status: ON HOLD | COMMUNITY
Start: 2019-04-11

## 2021-04-02 RX ORDER — PANTOPRAZOLE SODIUM 40 MG/1
TAKE 1 TABLET BY MOUTH EVERY DAY TABLET, DELAYED RELEASE ORAL
Qty: 90 | Refills: 3 | Status: ACTIVE | COMMUNITY
Start: 2016-07-26

## 2021-04-02 RX ORDER — ALPRAZOLAM 0.5 MG/1
1 TABLET TABLET ORAL TWICE A DAY
Status: ACTIVE | COMMUNITY

## 2021-04-02 RX ORDER — LEVOTHYROXINE SODIUM 25 UG/1
TAKE 1 TABLET DAILY TABLET ORAL
Refills: 0 | Status: ACTIVE | COMMUNITY
Start: 2019-03-15

## 2021-04-02 RX ORDER — PANTOPRAZOLE SODIUM 40 MG/1
TAKE 1 TABLET BY MOUTH EVERY DAY TABLET, DELAYED RELEASE ORAL
Qty: 90 | Refills: 0
Start: 2016-07-26

## 2021-04-02 RX ORDER — AMOXICILLIN 875 MG/1
TAKE ONE TABLET BY MOUTH TWICE A DAY UNTIL GONE TABLET, FILM COATED ORAL
Qty: 14 | Refills: 0 | Status: ON HOLD | COMMUNITY
Start: 2018-09-26

## 2021-04-02 RX ORDER — MULTIVITAMIN WITH IRON
TAKE 1 TABLET DAILY TABLET ORAL
Refills: 0 | Status: ACTIVE | COMMUNITY

## 2021-04-02 RX ORDER — PAROXETINE HYDROCHLORIDE HEMIHYDRATE 20 MG/1
TAKE ONE TABLET BY MOUTH EVERY MORNING TABLET, FILM COATED ORAL
Qty: 90 | Refills: 0 | Status: ON HOLD | COMMUNITY
Start: 2019-02-28

## 2021-04-02 RX ORDER — MELOXICAM 15 MG/1
TABLET ORAL
Qty: 90 | Refills: 0 | Status: ON HOLD | COMMUNITY
Start: 2020-07-10

## 2021-04-02 RX ORDER — PAROXETINE HYDROCHLORIDE 30 MG/1
TAKE ONE TABLET BY MOUTH EVERY MORNING TABLET ORAL
Qty: 90 | Refills: 0 | Status: ACTIVE | COMMUNITY
Start: 2019-09-24

## 2021-04-02 RX ORDER — SODIUM, POTASSIUM,MAG SULFATES 17.5-3.13G
354ML SOLUTION, RECONSTITUTED, ORAL ORAL ONCE
Qty: 354 MILLILITER | Refills: 0 | OUTPATIENT
Start: 2021-04-02 | End: 2021-04-03

## 2021-04-02 RX ORDER — VALSARTAN 80 MG/1
1 TABLET TABLET, FILM COATED ORAL ONCE A DAY
Status: ACTIVE | COMMUNITY

## 2021-04-02 RX ORDER — SUMATRIPTAN SUCCINATE 100 MG/1
1 TABLET AT LEAST 2 HOURS BETWEEN DOSES AS NEEDED TABLET, FILM COATED ORAL TWICE A DAY
Status: ACTIVE | COMMUNITY

## 2021-04-02 NOTE — HPI-TODAY'S VISIT:
71-year-old with history gastroesophageal reflux disease, nondysplastic short segment Squires's esophagus, functional dyspepsia, abnormal liver function tests secondary to nonalcoholic fatty liver disease, chronic constipation.  Last EGD on 10/31/2017 revealed Squires's esophagus one island from 37-38 cm, some patchy erythema the gastric antrum, multiple 3-6 mm sessile polyps in the gastric body and normal duodenum.  Biopsies revealed squamocolumnar mucosa with reflux type changes without intestinal metaplasia or dysplasia, polypoid foveolar hyperplasia, no H. pylori.  Last colonoscopy was on 11/15/2017 that revealed a normal terminal ileum, grade 2 internal hemorrhoids, many small and large mouth diverticuli of the left and transverse colon.  There were 2 polyps in the ascending colon each 3 mm in size and a 6 mm polyp in the descending colon.  There is localized area of moderate erythema in the sigmoid colon biopsies were obtained.  There is a small amount of possible this was nodular and erythematous and adjacent to a diverticulum.  There was a single large diverticulum found in the sigmoid colon with shahid-diverticula erythema, discharge was seen in association with the diverticular opening suspicion for diverticulitis.  The ascending colon polyps were tubular adenomas and the sigmoid colon biopsy was diverticular disease associated with colitis.  She has subsequently had hemorrhoidal banding by Dr. Díaz. She had an EGD on 2/23/2021 that revealed 1 island of Squires's mucosa at 37 to 38 cm biopsies revealed no dysplasia or intestinal metaplasia.  There is some mild nodularity and erythema the gastric body biopsies revealed proton pump inhibitor effect.  She had multiple 3 to 8 mm sessile/pedunculated polyps in the gastric body biopsies revealed fundic gland polyps.  The duodenum was normal She is doing overall okay.  She has no heartburn on Protonix 40 mg each day.  There is no dysphagia or abdominal pain.  She's had no nausea or vomiting.  She does have a bowel movement every day if she takes her Metamucil every day.  She is bothered by external hemorrhoids with some burning and external hemorrhoids.  She uses Desitin for this.  She states that Preparation H burns so she cannot use that.

## 2021-04-07 PROBLEM — 428283002: Status: ACTIVE | Noted: 2021-04-07

## 2021-04-08 ENCOUNTER — TELEPHONE ENCOUNTER (OUTPATIENT)
Dept: URBAN - METROPOLITAN AREA CLINIC 113 | Facility: CLINIC | Age: 71
End: 2021-04-08

## 2021-04-08 RX ORDER — POLYETHYLENE GLYCOL 3350, SODIUM SULFATE, SODIUM CHLORIDE, POTASSIUM CHLORIDE, ASCORBIC ACID, SODIUM ASCORBATE 140-9-5.2G
140 G KIT ORAL ONCE
Qty: 140 G | Refills: 0 | OUTPATIENT
End: 2021-04-09

## 2021-05-17 ENCOUNTER — OFFICE VISIT (OUTPATIENT)
Dept: URBAN - METROPOLITAN AREA SURGERY CENTER 25 | Facility: SURGERY CENTER | Age: 71
End: 2021-05-17

## 2021-06-14 ENCOUNTER — CLAIMS CREATED FROM THE CLAIM WINDOW (OUTPATIENT)
Dept: URBAN - METROPOLITAN AREA SURGERY CENTER 25 | Facility: SURGERY CENTER | Age: 71
End: 2021-06-14
Payer: MEDICARE

## 2021-06-14 ENCOUNTER — CLAIMS CREATED FROM THE CLAIM WINDOW (OUTPATIENT)
Dept: URBAN - METROPOLITAN AREA CLINIC 4 | Facility: CLINIC | Age: 71
End: 2021-06-14
Payer: MEDICARE

## 2021-06-14 DIAGNOSIS — D12.2 BENIGN NEOPLASM OF ASCENDING COLON: ICD-10-CM

## 2021-06-14 DIAGNOSIS — Z86.010 H/O ADENOMATOUS POLYP OF COLON: ICD-10-CM

## 2021-06-14 DIAGNOSIS — D12.3 ADENOMA OF TRANSVERSE COLON: ICD-10-CM

## 2021-06-14 DIAGNOSIS — D12.3 BENIGN NEOPLASM OF TRANSVERSE COLON: ICD-10-CM

## 2021-06-14 DIAGNOSIS — D12.2 ADENOMA OF ASCENDING COLON: ICD-10-CM

## 2021-06-14 PROCEDURE — 45385 COLONOSCOPY W/LESION REMOVAL: CPT | Performed by: INTERNAL MEDICINE

## 2021-06-14 PROCEDURE — 88305 TISSUE EXAM BY PATHOLOGIST: CPT | Performed by: PATHOLOGY

## 2021-06-14 PROCEDURE — G8907 PT DOC NO EVENTS ON DISCHARG: HCPCS | Performed by: INTERNAL MEDICINE

## 2021-06-14 RX ORDER — ALPRAZOLAM 0.5 MG/1
1 TABLET TABLET ORAL TWICE A DAY
Status: ACTIVE | COMMUNITY

## 2021-06-14 RX ORDER — HYDROCHLOROTHIAZIDE 50 MG/1
TAKE 1 TABLET DAILY TABLET ORAL
Refills: 0 | Status: ACTIVE | COMMUNITY
Start: 2019-03-15

## 2021-06-14 RX ORDER — SUMATRIPTAN SUCCINATE 100 MG/1
1 TABLET AT LEAST 2 HOURS BETWEEN DOSES AS NEEDED TABLET, FILM COATED ORAL TWICE A DAY
Status: ACTIVE | COMMUNITY

## 2021-06-14 RX ORDER — PRAVASTATIN SODIUM 20 MG/1
TAKE 1 TABLET DAILY TABLET ORAL
Refills: 0 | Status: ON HOLD | COMMUNITY

## 2021-06-14 RX ORDER — DOXYCYCLINE 100 MG/1
TAKE ONE TABLET BY MOUTH TWICE A DAY FOR 12 DAYS TABLET, FILM COATED ORAL
Qty: 24 | Refills: 0 | Status: ON HOLD | COMMUNITY
Start: 2019-08-08

## 2021-06-14 RX ORDER — PANTOPRAZOLE SODIUM 40 MG/1
TAKE 1 TABLET BY MOUTH EVERY DAY TABLET, DELAYED RELEASE ORAL
Qty: 90 | Refills: 0 | Status: ACTIVE | COMMUNITY
Start: 2016-07-26

## 2021-06-14 RX ORDER — MUPIROCIN 20 MG/G
APPLY TO THE AFFECTED AREA(S) OF EROSIONS TOPICALLY TWICE DAILY UNTIL OINTMENT TOPICAL
Qty: 22 | Refills: 0 | Status: ON HOLD | COMMUNITY
Start: 2019-08-08

## 2021-06-14 RX ORDER — MULTIVITAMIN WITH IRON
TAKE 1 TABLET DAILY TABLET ORAL
Refills: 0 | Status: ACTIVE | COMMUNITY

## 2021-06-14 RX ORDER — ALPRAZOLAM 2 MG/1
TAKE 1 TABLET DAILY PRN TABLET ORAL
Refills: 0 | Status: ACTIVE | COMMUNITY
Start: 2006-02-10

## 2021-06-14 RX ORDER — VALSARTAN 80 MG/1
1 TABLET TABLET, FILM COATED ORAL ONCE A DAY
Status: ACTIVE | COMMUNITY

## 2021-06-14 RX ORDER — LEVOTHYROXINE SODIUM 25 UG/1
TAKE 1 TABLET DAILY TABLET ORAL
Refills: 0 | Status: ACTIVE | COMMUNITY
Start: 2019-03-15

## 2021-06-14 RX ORDER — ETODOLAC 400 MG/1
TAKE ONE TABLET BY MOUTH THREE TIMES A DAY AS NEEDED FOR PAIN TABLET, COATED ORAL
Qty: 12 | Refills: 0 | Status: ON HOLD | COMMUNITY
Start: 2019-11-04

## 2021-06-14 RX ORDER — EZETIMIBE 10 MG/1
1 TABLET TABLET ORAL ONCE A DAY
Status: ACTIVE | COMMUNITY

## 2021-06-14 RX ORDER — PAROXETINE HYDROCHLORIDE 40 MG/1
TAKE ONE TABLET BY MOUTH ONE TIME DAILY TABLET, FILM COATED ORAL
Qty: 90 | Refills: 0 | Status: ON HOLD | COMMUNITY
Start: 2019-03-14

## 2021-06-14 RX ORDER — AMOXICILLIN 875 MG/1
TAKE ONE TABLET BY MOUTH TWICE A DAY UNTIL GONE TABLET, FILM COATED ORAL
Qty: 14 | Refills: 0 | Status: ON HOLD | COMMUNITY
Start: 2018-09-26

## 2021-06-14 RX ORDER — PAROXETINE HYDROCHLORIDE 30 MG/1
TAKE ONE TABLET BY MOUTH EVERY MORNING TABLET ORAL
Qty: 90 | Refills: 0 | Status: ACTIVE | COMMUNITY
Start: 2019-09-24

## 2021-06-14 RX ORDER — AZITHROMYCIN DIHYDRATE 250 MG/1
TABLET, FILM COATED ORAL
Qty: 6 | Refills: 0 | Status: ON HOLD | COMMUNITY
Start: 2014-08-18

## 2021-06-14 RX ORDER — MELOXICAM 15 MG/1
TABLET ORAL
Qty: 90 | Refills: 0 | Status: ON HOLD | COMMUNITY
Start: 2020-07-10

## 2021-06-14 RX ORDER — AMOXICILLIN 500 MG/1
TAKE TWO CAPSULES BY MOUTH NOW, THEN TAKE ONE CAPSULE BY MOUTH THREE T CAPSULE ORAL
Qty: 30 | Refills: 0 | Status: ON HOLD | COMMUNITY
Start: 2019-11-04

## 2021-06-14 RX ORDER — PAROXETINE HYDROCHLORIDE HEMIHYDRATE 20 MG/1
TAKE ONE TABLET BY MOUTH EVERY MORNING TABLET, FILM COATED ORAL
Qty: 90 | Refills: 0 | Status: ON HOLD | COMMUNITY
Start: 2019-02-28

## 2021-06-14 RX ORDER — BIFIDOBACTERIUM LONGUM 10MM CELL
USE AS DIRECTED CAPSULE ORAL
Refills: 0 | Status: ON HOLD | COMMUNITY

## 2021-06-14 RX ORDER — METOPROLOL SUCCINATE 25 MG/1
TAKE 1 TABLET ONCE DAILY TABLET, EXTENDED RELEASE ORAL
Refills: 0 | Status: ON HOLD | COMMUNITY

## 2021-06-14 RX ORDER — TRIAMCINOLONE ACETONIDE 1 MG/G
APPLY TO THE AFFECTED AREA(S) TOPICALLY TWICE DAILY CREAM TOPICAL
Qty: 45 | Refills: 0 | Status: ON HOLD | COMMUNITY
Start: 2019-04-11

## 2021-06-14 RX ORDER — CIPROFLOXACIN AND DEXAMETHASONE 3; 1 MG/ML; MG/ML
INSTILL 4 DROPS IN THE LEFT EAR TWICE A DAY FOR 7 DAYS SUSPENSION/ DROPS AURICULAR (OTIC)
Qty: 8 | Refills: 0 | Status: ON HOLD | COMMUNITY
Start: 2019-07-24

## 2021-06-14 RX ORDER — CALCIUM CITRATE/VITAMIN D3 315MG-6.25
TABLET ORAL
Refills: 0 | Status: ON HOLD | COMMUNITY

## 2021-07-27 ENCOUNTER — DASHBOARD ENCOUNTERS (OUTPATIENT)
Age: 71
End: 2021-07-27

## 2021-07-27 ENCOUNTER — OFFICE VISIT (OUTPATIENT)
Dept: URBAN - METROPOLITAN AREA CLINIC 113 | Facility: CLINIC | Age: 71
End: 2021-07-27
Payer: MEDICARE

## 2021-07-27 ENCOUNTER — OFFICE VISIT (OUTPATIENT)
Dept: URBAN - METROPOLITAN AREA CLINIC 113 | Facility: CLINIC | Age: 71
End: 2021-07-27

## 2021-07-27 VITALS
BODY MASS INDEX: 30.48 KG/M2 | SYSTOLIC BLOOD PRESSURE: 115 MMHG | RESPIRATION RATE: 18 BRPM | DIASTOLIC BLOOD PRESSURE: 71 MMHG | TEMPERATURE: 98.1 F | HEIGHT: 63 IN | WEIGHT: 172 LBS | HEART RATE: 80 BPM

## 2021-07-27 DIAGNOSIS — K64.8 INTERNAL HEMORRHOID: ICD-10-CM

## 2021-07-27 DIAGNOSIS — D12.6 TUBULAR ADENOMA OF COLON: ICD-10-CM

## 2021-07-27 DIAGNOSIS — K57.90 DIVERTICULOSIS: ICD-10-CM

## 2021-07-27 PROBLEM — 444898006: Status: ACTIVE | Noted: 2021-02-10

## 2021-07-27 PROBLEM — 397881000: Status: ACTIVE | Noted: 2021-07-27

## 2021-07-27 PROCEDURE — 99214 OFFICE O/P EST MOD 30 MIN: CPT | Performed by: INTERNAL MEDICINE

## 2021-07-27 RX ORDER — AZITHROMYCIN DIHYDRATE 250 MG/1
TABLET, FILM COATED ORAL
Qty: 6 | Refills: 0 | Status: DISCONTINUED | COMMUNITY
Start: 2014-08-18

## 2021-07-27 RX ORDER — BIFIDOBACTERIUM LONGUM 10MM CELL
USE AS DIRECTED CAPSULE ORAL
Refills: 0 | Status: DISCONTINUED | COMMUNITY

## 2021-07-27 RX ORDER — ALPRAZOLAM 2 MG/1
TAKE 1 TABLET DAILY PRN TABLET ORAL
Refills: 0 | Status: DISCONTINUED | COMMUNITY
Start: 2006-02-10

## 2021-07-27 RX ORDER — HYDROCORTISONE 25 MG/G
1 APPLICATION CREAM TOPICAL TWICE A DAY
Qty: 1 TUBE | Refills: 1 | OUTPATIENT
Start: 2021-07-27 | End: 2021-09-25

## 2021-07-27 RX ORDER — PAROXETINE HYDROCHLORIDE 30 MG/1
TAKE ONE TABLET BY MOUTH EVERY MORNING TABLET ORAL
Qty: 90 | Refills: 0 | Status: ACTIVE | COMMUNITY
Start: 2019-09-24

## 2021-07-27 RX ORDER — ALPRAZOLAM 0.5 MG/1
1 TABLET TABLET ORAL TWICE A DAY
Status: ACTIVE | COMMUNITY

## 2021-07-27 RX ORDER — PRAVASTATIN SODIUM 20 MG/1
TAKE 1 TABLET DAILY TABLET ORAL
Refills: 0 | Status: DISCONTINUED | COMMUNITY

## 2021-07-27 RX ORDER — PANTOPRAZOLE SODIUM 40 MG/1
TAKE 1 TABLET BY MOUTH EVERY DAY TABLET, DELAYED RELEASE ORAL
Qty: 90 | Refills: 0 | Status: ACTIVE | COMMUNITY
Start: 2016-07-26

## 2021-07-27 RX ORDER — ETODOLAC 400 MG/1
TAKE ONE TABLET BY MOUTH THREE TIMES A DAY AS NEEDED FOR PAIN TABLET, COATED ORAL
Qty: 12 | Refills: 0 | Status: DISCONTINUED | COMMUNITY
Start: 2019-11-04

## 2021-07-27 RX ORDER — CALCIUM CITRATE/VITAMIN D3 315MG-6.25
TABLET ORAL
Refills: 0 | Status: DISCONTINUED | COMMUNITY

## 2021-07-27 RX ORDER — PAROXETINE HYDROCHLORIDE HEMIHYDRATE 20 MG/1
TAKE ONE TABLET BY MOUTH EVERY MORNING TABLET, FILM COATED ORAL
Qty: 90 | Refills: 0 | Status: DISCONTINUED | COMMUNITY
Start: 2019-02-28

## 2021-07-27 RX ORDER — DOXYCYCLINE 100 MG/1
TAKE ONE TABLET BY MOUTH TWICE A DAY FOR 12 DAYS TABLET, FILM COATED ORAL
Qty: 24 | Refills: 0 | Status: DISCONTINUED | COMMUNITY
Start: 2019-08-08

## 2021-07-27 RX ORDER — MELOXICAM 15 MG/1
TABLET ORAL
Qty: 90 | Refills: 0 | Status: DISCONTINUED | COMMUNITY
Start: 2020-07-10

## 2021-07-27 RX ORDER — AMOXICILLIN 500 MG/1
TAKE TWO CAPSULES BY MOUTH NOW, THEN TAKE ONE CAPSULE BY MOUTH THREE T CAPSULE ORAL
Qty: 30 | Refills: 0 | Status: DISCONTINUED | COMMUNITY
Start: 2019-11-04

## 2021-07-27 RX ORDER — EZETIMIBE 10 MG/1
1 TABLET TABLET ORAL ONCE A DAY
Status: ACTIVE | COMMUNITY

## 2021-07-27 RX ORDER — PAROXETINE HYDROCHLORIDE 40 MG/1
TAKE ONE TABLET BY MOUTH ONE TIME DAILY TABLET, FILM COATED ORAL
Qty: 90 | Refills: 0 | Status: DISCONTINUED | COMMUNITY
Start: 2019-03-14

## 2021-07-27 RX ORDER — VALSARTAN 80 MG/1
1 TABLET TABLET, FILM COATED ORAL ONCE A DAY
Status: ACTIVE | COMMUNITY

## 2021-07-27 RX ORDER — CIPROFLOXACIN AND DEXAMETHASONE 3; 1 MG/ML; MG/ML
INSTILL 4 DROPS IN THE LEFT EAR TWICE A DAY FOR 7 DAYS SUSPENSION/ DROPS AURICULAR (OTIC)
Qty: 8 | Refills: 0 | Status: DISCONTINUED | COMMUNITY
Start: 2019-07-24

## 2021-07-27 RX ORDER — MULTIVITAMIN WITH IRON
TAKE 1 TABLET DAILY TABLET ORAL
Refills: 0 | Status: ACTIVE | COMMUNITY

## 2021-07-27 RX ORDER — MUPIROCIN 20 MG/G
APPLY TO THE AFFECTED AREA(S) OF EROSIONS TOPICALLY TWICE DAILY UNTIL OINTMENT TOPICAL
Qty: 22 | Refills: 0 | Status: DISCONTINUED | COMMUNITY
Start: 2019-08-08

## 2021-07-27 RX ORDER — METOPROLOL SUCCINATE 25 MG/1
TAKE 1 TABLET ONCE DAILY TABLET, EXTENDED RELEASE ORAL
Refills: 0 | Status: DISCONTINUED | COMMUNITY

## 2021-07-27 RX ORDER — SUMATRIPTAN SUCCINATE 100 MG/1
1 TABLET AT LEAST 2 HOURS BETWEEN DOSES AS NEEDED TABLET, FILM COATED ORAL TWICE A DAY
Status: ACTIVE | COMMUNITY

## 2021-07-27 RX ORDER — HYDROCHLOROTHIAZIDE 50 MG/1
TAKE 1 /2 TABLET DAILY TABLET ORAL ONCE A DAY
Refills: 0 | Status: ACTIVE | COMMUNITY
Start: 2019-03-15

## 2021-07-27 RX ORDER — LEVOTHYROXINE SODIUM 0.07 MG/1
1 TABLET IN THE MORNING ON AN EMPTY STOMACH TABLET ORAL ONCE A DAY
Refills: 0 | Status: ACTIVE | COMMUNITY
Start: 2019-03-15

## 2021-07-27 RX ORDER — TRIAMCINOLONE ACETONIDE 1 MG/G
APPLY TO THE AFFECTED AREA(S) TOPICALLY TWICE DAILY CREAM TOPICAL
Qty: 45 | Refills: 0 | Status: DISCONTINUED | COMMUNITY
Start: 2019-04-11

## 2021-07-27 RX ORDER — AMOXICILLIN 875 MG/1
TAKE ONE TABLET BY MOUTH TWICE A DAY UNTIL GONE TABLET, FILM COATED ORAL
Qty: 14 | Refills: 0 | Status: DISCONTINUED | COMMUNITY
Start: 2018-09-26

## 2021-07-27 NOTE — HPI-TODAY'S VISIT:
71-year-old woman with a history of Squires's esophagus without dysplasia due for repeat EGD in 2024, and history of colon polyps, presenting for follow-up after a colonoscopy. A colonoscopy was performed 6/14/2021.  Findings included good bowel preparation, normal ileum, nonbleeding internal hemorrhoids, sigmoid colon diverticulosis, removal of (3) 3 to 5 mm polyps from the ascending colon, and removal of a 6 mm polyp from the transverse colon.  Pathology revealed adenomatous tissue.  Repeat colonoscopy is recommended in 3 years as part of colon cancer prevention.  This will be due in June 2024.   Patient is without any abdominal complaints. No dysphagia, heartburn, regurgitation, unintentional weight loss, nausea, vomiting, hematemesis or melena. Bowels are moving regularly without blood per rectum. No complaints of bloating. No jaundice, icterus.

## 2021-08-08 ENCOUNTER — TELEPHONE ENCOUNTER (OUTPATIENT)
Dept: URBAN - METROPOLITAN AREA CLINIC 113 | Facility: CLINIC | Age: 71
End: 2021-08-08

## 2021-08-08 RX ORDER — HYDROCORTISONE 10 MG/G
1 APPLICATION CREAM TOPICAL
Qty: 15 GRAMS | Refills: 0 | OUTPATIENT
Start: 2021-08-08 | End: 2021-08-13

## 2021-09-06 ENCOUNTER — ERX REFILL RESPONSE (OUTPATIENT)
Dept: URBAN - METROPOLITAN AREA CLINIC 107 | Facility: CLINIC | Age: 71
End: 2021-09-06

## 2021-09-06 RX ORDER — PANTOPRAZOLE SODIUM 40 MG/1
TAKE 1 TABLET BY MOUTH EVERY DAY TABLET, DELAYED RELEASE ORAL
Qty: 90 | Refills: 0 | OUTPATIENT

## 2021-09-06 RX ORDER — PANTOPRAZOLE SODIUM 40 MG/1
TAKE ONE TABLET BY MOUTH DAILY TABLET, DELAYED RELEASE ORAL
Qty: 90 TABLET | Refills: 1 | OUTPATIENT

## 2022-01-20 ENCOUNTER — ERX REFILL RESPONSE (OUTPATIENT)
Dept: URBAN - METROPOLITAN AREA CLINIC 107 | Facility: CLINIC | Age: 72
End: 2022-01-20

## 2022-01-20 RX ORDER — PANTOPRAZOLE SODIUM 40 MG/1
TAKE ONE TABLET BY MOUTH DAILY TABLET, DELAYED RELEASE ORAL
Qty: 90 TABLET | Refills: 1 | OUTPATIENT

## 2024-06-27 ENCOUNTER — LAB OUTSIDE AN ENCOUNTER (OUTPATIENT)
Dept: URBAN - METROPOLITAN AREA CLINIC 113 | Facility: CLINIC | Age: 74
End: 2024-06-27

## 2024-06-27 ENCOUNTER — OFFICE VISIT (OUTPATIENT)
Dept: URBAN - METROPOLITAN AREA CLINIC 113 | Facility: CLINIC | Age: 74
End: 2024-06-27
Payer: MEDICARE

## 2024-06-27 VITALS
DIASTOLIC BLOOD PRESSURE: 76 MMHG | RESPIRATION RATE: 18 BRPM | WEIGHT: 177.2 LBS | HEIGHT: 63 IN | HEART RATE: 73 BPM | SYSTOLIC BLOOD PRESSURE: 115 MMHG | TEMPERATURE: 97.5 F | BODY MASS INDEX: 31.4 KG/M2

## 2024-06-27 DIAGNOSIS — K21.9 CHRONIC GERD: ICD-10-CM

## 2024-06-27 DIAGNOSIS — K22.70 BARRETT'S ESOPHAGUS WITHOUT DYSPLASIA: ICD-10-CM

## 2024-06-27 DIAGNOSIS — R11.0 CHRONIC NAUSEA: ICD-10-CM

## 2024-06-27 DIAGNOSIS — R10.11 RUQ PAIN: ICD-10-CM

## 2024-06-27 DIAGNOSIS — D12.6 TUBULAR ADENOMA OF COLON: ICD-10-CM

## 2024-06-27 PROBLEM — 235595009: Status: ACTIVE | Noted: 2024-06-27

## 2024-06-27 PROCEDURE — 99204 OFFICE O/P NEW MOD 45 MIN: CPT | Performed by: NURSE PRACTITIONER

## 2024-06-27 RX ORDER — DOCUSATE SODIUM 100 MG/1
1 CAPSULE AS NEEDED CAPSULE, LIQUID FILLED ORAL ONCE A DAY
Status: ACTIVE | COMMUNITY

## 2024-06-27 RX ORDER — POTASSIUM CHLORIDE 10 MEQ/1
1 TABLET WITH FOOD TABLET, FILM COATED, EXTENDED RELEASE ORAL TWICE A DAY
Status: ACTIVE | COMMUNITY

## 2024-06-27 RX ORDER — ALPRAZOLAM 0.5 MG/1
1 TABLET TABLET ORAL TWICE A DAY
Status: ON HOLD | COMMUNITY

## 2024-06-27 RX ORDER — FAMOTIDINE 40 MG/1
1 TABLET AT BEDTIME TABLET, FILM COATED ORAL ONCE A DAY
Qty: 90 TABLET | Refills: 3 | OUTPATIENT
Start: 2024-06-27

## 2024-06-27 RX ORDER — ONDANSETRON 4 MG/1
1 TABLET ON THE TONGUE AND ALLOW TO DISSOLVE TABLET, ORALLY DISINTEGRATING ORAL
Qty: 30 | Refills: 0 | OUTPATIENT
Start: 2024-06-27

## 2024-06-27 RX ORDER — PAROXETINE HYDROCHLORIDE 30 MG/1
TAKE ONE TABLET BY MOUTH EVERY MORNING TABLET ORAL
Qty: 90 | Refills: 0 | Status: ACTIVE | COMMUNITY
Start: 2019-09-24

## 2024-06-27 RX ORDER — FLUTICASONE PROPIONATE 50 UG/1
1 SPRAY IN EACH NOSTRIL SPRAY, METERED NASAL ONCE A DAY
Status: ACTIVE | COMMUNITY

## 2024-06-27 RX ORDER — CHOLECALCIFEROL (VITAMIN D3) 50 MCG
1 TABLET TABLET ORAL ONCE A DAY
Refills: 0 | Status: ACTIVE | COMMUNITY

## 2024-06-27 RX ORDER — LEVOTHYROXINE SODIUM 88 UG/1
1 TABLET IN THE MORNING ON AN EMPTY STOMACH CAPSULE ORAL ONCE A DAY
Refills: 0 | Status: ACTIVE | COMMUNITY
Start: 2019-03-15

## 2024-06-27 RX ORDER — MULTIVITAMIN WITH IRON
TAKE 1 TABLET DAILY TABLET ORAL
Refills: 0 | Status: ON HOLD | COMMUNITY

## 2024-06-27 RX ORDER — PANTOPRAZOLE SODIUM 40 MG/1
TAKE ONE TABLET BY MOUTH DAILY TABLET, DELAYED RELEASE ORAL
OUTPATIENT

## 2024-06-27 RX ORDER — EZETIMIBE 10 MG/1
1 TABLET TABLET ORAL ONCE A DAY
Status: ON HOLD | COMMUNITY

## 2024-06-27 RX ORDER — PANTOPRAZOLE SODIUM 40 MG/1
TAKE ONE TABLET BY MOUTH DAILY TABLET, DELAYED RELEASE ORAL
Qty: 90 TABLET | Refills: 1 | Status: ACTIVE | COMMUNITY

## 2024-06-27 RX ORDER — HYDROCHLOROTHIAZIDE 50 MG/1
TAKE 1 /2 TABLET DAILY TABLET ORAL ONCE A DAY
Refills: 0 | Status: ACTIVE | COMMUNITY
Start: 2019-03-15

## 2024-06-27 RX ORDER — VALSARTAN 80 MG/1
1 TABLET TABLET, FILM COATED ORAL ONCE A DAY
Status: ACTIVE | COMMUNITY

## 2024-06-27 RX ORDER — BUSPIRONE HYDROCHLORIDE 15 MG/1
1 TABLET TABLET ORAL TWICE A DAY
Status: ACTIVE | COMMUNITY

## 2024-06-27 RX ORDER — SODIUM, POTASSIUM,MAG SULFATES 17.5-3.13G
354 ML SOLUTION, RECONSTITUTED, ORAL ORAL ONCE
Qty: 354 MILLILITER | Refills: 0 | OUTPATIENT
Start: 2024-06-27 | End: 2024-06-28

## 2024-06-27 RX ORDER — ESTRADIOL 0.1 MG/G
AS DIRECTED CREAM VAGINAL
Status: ACTIVE | COMMUNITY

## 2024-06-27 RX ORDER — SUMATRIPTAN SUCCINATE 100 MG/1
1 TABLET AT LEAST 2 HOURS BETWEEN DOSES AS NEEDED TABLET, FILM COATED ORAL TWICE A DAY
Status: ON HOLD | COMMUNITY

## 2024-06-27 RX ORDER — POLYETHYLENE GLYCOL 1450
AS DIRECTED POWDER (GRAM) MISCELLANEOUS
Status: ACTIVE | COMMUNITY

## 2024-06-27 NOTE — HPI-TODAY'S VISIT:
73 yo woman with a history of Squires's esophagus, adenomatous colon polyps, internal hemorrhoids, and diverticulosis, presenting to discuss a colonoscopy and EGD.  There is no trouble swallowing. She has been having upper abdominal pain on the right side, radiating to her back. The pain is constant. She cannot rest at nighttime. The pain radiates into her upper back. The pain can be worse after meals. She has bloating. She has nausea periodically. She denies any vomiting. She can have some acid reflux despite pantoprazole. She is concerned about fatty liver being a problem. She does not drink. She has bowel movements daily without blood or melena. She is taking Metamucil daily which is helpful.  She tells me that she was recently diagnosed with diabetes in the last year. She is not taking GLP-1 medications due to side effects. Jardiance is causing her to have frequent UTIs.  Colonoscopy was performed 6/14/2021. Findings included good bowel preparation, normal ileum, nonbleeding internal hemorrhoids, sigmoid colon diverticulosis, removal of (3) 3 to 5 mm polyps from the ascending colon, and removal of a 6 mm polyp from the transverse colon. Pathology revealed adenomatous tissue. Repeat colonoscopy is recommended in 3 years as part of colon cancer prevention. This will be due in June 2024.  EGD 2/23/21: Esophageal mucosal changes secondary to short segment Squires's disease s/p biopsies. Erythematous mucosa in the gastric body s/p biopsies. Multiple gastric polyps s/p biopsies. Normal examined duodenum. Esophagus biopsies showed reflux type changes. Stomach body biopsies and polyp biopsies showed PPI effect and fundic gland tissue.

## 2024-06-28 LAB
A/G RATIO: 2.2
ALBUMIN: 4.6
ALKALINE PHOSPHATASE: 66
ALT (SGPT): 63
AMYLASE: 39
AST (SGOT): 38
BILIRUBIN, TOTAL: 0.5
BUN/CREATININE RATIO: (no result)
BUN: 13
CALCIUM: 9.8
CARBON DIOXIDE, TOTAL: 26
CHLORIDE: 103
CREATININE: 0.87
EGFR: 69
GLOBULIN, TOTAL: 2.1
GLUCOSE: 122
HEMATOCRIT: 38.8
HEMOGLOBIN: 12.5
LIPASE: 40
MCH: 27.5
MCHC: 32.2
MCV: 85.3
MPV: 10
PLATELET COUNT: 207
POTASSIUM: 4.2
PROTEIN, TOTAL: 6.7
RDW: 14.3
RED BLOOD CELL COUNT: 4.55
SODIUM: 140
WHITE BLOOD CELL COUNT: 4

## 2024-07-08 ENCOUNTER — TELEPHONE ENCOUNTER (OUTPATIENT)
Dept: URBAN - METROPOLITAN AREA CLINIC 113 | Facility: CLINIC | Age: 74
End: 2024-07-08

## 2024-07-09 ENCOUNTER — TELEPHONE ENCOUNTER (OUTPATIENT)
Dept: URBAN - METROPOLITAN AREA CLINIC 113 | Facility: CLINIC | Age: 74
End: 2024-07-09

## 2024-07-12 ENCOUNTER — TELEPHONE ENCOUNTER (OUTPATIENT)
Dept: URBAN - METROPOLITAN AREA CLINIC 113 | Facility: CLINIC | Age: 74
End: 2024-07-12

## 2024-07-18 ENCOUNTER — CLAIMS CREATED FROM THE CLAIM WINDOW (OUTPATIENT)
Dept: URBAN - METROPOLITAN AREA SURGERY CENTER 25 | Facility: SURGERY CENTER | Age: 74
End: 2024-07-18

## 2024-07-18 ENCOUNTER — CLAIMS CREATED FROM THE CLAIM WINDOW (OUTPATIENT)
Dept: URBAN - METROPOLITAN AREA SURGERY CENTER 25 | Facility: SURGERY CENTER | Age: 74
End: 2024-07-18
Payer: MEDICARE

## 2024-07-18 ENCOUNTER — CLAIMS CREATED FROM THE CLAIM WINDOW (OUTPATIENT)
Dept: URBAN - METROPOLITAN AREA CLINIC 4 | Facility: CLINIC | Age: 74
End: 2024-07-18
Payer: MEDICARE

## 2024-07-18 DIAGNOSIS — K31.7 POLYP OF STOMACH AND DUODENUM: ICD-10-CM

## 2024-07-18 DIAGNOSIS — K31.89 OTHER DISEASES OF STOMACH AND DUODENUM: ICD-10-CM

## 2024-07-18 DIAGNOSIS — K22.70 BARRETT ESOPHAGUS: ICD-10-CM

## 2024-07-18 DIAGNOSIS — K29.70 CHRONIC ACITVE GASTRITIS (H.PYLORI NEGATIVE): ICD-10-CM

## 2024-07-18 DIAGNOSIS — K31.7 BENIGN GASTRIC POLYP: ICD-10-CM

## 2024-07-18 DIAGNOSIS — K29.70 GASTRITIS, UNSPECIFIED, WITHOUT BLEEDING: ICD-10-CM

## 2024-07-18 DIAGNOSIS — R10.11 ABDOMINAL BURNING SENSATION IN RIGHT UPPER QUADRANT: ICD-10-CM

## 2024-07-18 DIAGNOSIS — K21.9 GASTRO-ESOPHAGEAL REFLUX DISEASE WITHOUT ESOPHAGITIS: ICD-10-CM

## 2024-07-18 DIAGNOSIS — K21.9 ACID REFLUX: ICD-10-CM

## 2024-07-18 PROCEDURE — 00731 ANES UPR GI NDSC PX NOS: CPT | Performed by: ANESTHESIOLOGY

## 2024-07-18 PROCEDURE — 88305 TISSUE EXAM BY PATHOLOGIST: CPT | Performed by: PATHOLOGY

## 2024-07-18 PROCEDURE — 43239 EGD BIOPSY SINGLE/MULTIPLE: CPT | Performed by: INTERNAL MEDICINE

## 2024-07-18 PROCEDURE — 88312 SPECIAL STAINS GROUP 1: CPT | Performed by: PATHOLOGY

## 2024-07-18 PROCEDURE — 00731 ANES UPR GI NDSC PX NOS: CPT | Performed by: NURSE ANESTHETIST, CERTIFIED REGISTERED

## 2024-07-18 PROCEDURE — 88313 SPECIAL STAINS GROUP 2: CPT | Performed by: PATHOLOGY

## 2024-07-18 RX ORDER — ALPRAZOLAM 0.5 MG/1
1 TABLET TABLET ORAL TWICE A DAY
Status: ON HOLD | COMMUNITY

## 2024-07-18 RX ORDER — MULTIVITAMIN WITH IRON
TAKE 1 TABLET DAILY TABLET ORAL
Refills: 0 | Status: ON HOLD | COMMUNITY

## 2024-07-18 RX ORDER — LEVOTHYROXINE SODIUM 88 UG/1
1 TABLET IN THE MORNING ON AN EMPTY STOMACH CAPSULE ORAL ONCE A DAY
Refills: 0 | Status: ACTIVE | COMMUNITY
Start: 2019-03-15

## 2024-07-18 RX ORDER — FAMOTIDINE 40 MG/1
1 TABLET AT BEDTIME TABLET, FILM COATED ORAL ONCE A DAY
Qty: 90 TABLET | Refills: 3 | Status: ACTIVE | COMMUNITY
Start: 2024-06-27

## 2024-07-18 RX ORDER — CHOLECALCIFEROL (VITAMIN D3) 50 MCG
1 TABLET TABLET ORAL ONCE A DAY
Refills: 0 | Status: ACTIVE | COMMUNITY

## 2024-07-18 RX ORDER — ONDANSETRON 4 MG/1
1 TABLET ON THE TONGUE AND ALLOW TO DISSOLVE TABLET, ORALLY DISINTEGRATING ORAL
Qty: 30 | Refills: 0 | Status: ACTIVE | COMMUNITY
Start: 2024-06-27

## 2024-07-18 RX ORDER — PANTOPRAZOLE SODIUM 40 MG/1
TAKE ONE TABLET BY MOUTH DAILY TABLET, DELAYED RELEASE ORAL
Status: ACTIVE | COMMUNITY

## 2024-07-18 RX ORDER — FLUTICASONE PROPIONATE 50 UG/1
1 SPRAY IN EACH NOSTRIL SPRAY, METERED NASAL ONCE A DAY
Status: ACTIVE | COMMUNITY

## 2024-07-18 RX ORDER — VALSARTAN 80 MG/1
1 TABLET TABLET, FILM COATED ORAL ONCE A DAY
Status: ACTIVE | COMMUNITY

## 2024-07-18 RX ORDER — EZETIMIBE 10 MG/1
1 TABLET TABLET ORAL ONCE A DAY
Status: ON HOLD | COMMUNITY

## 2024-07-18 RX ORDER — PAROXETINE HYDROCHLORIDE 30 MG/1
TAKE ONE TABLET BY MOUTH EVERY MORNING TABLET ORAL
Qty: 90 | Refills: 0 | Status: ACTIVE | COMMUNITY
Start: 2019-09-24

## 2024-07-18 RX ORDER — HYDROCHLOROTHIAZIDE 50 MG/1
TAKE 1 /2 TABLET DAILY TABLET ORAL ONCE A DAY
Refills: 0 | Status: ACTIVE | COMMUNITY
Start: 2019-03-15

## 2024-07-18 RX ORDER — SUMATRIPTAN SUCCINATE 100 MG/1
1 TABLET AT LEAST 2 HOURS BETWEEN DOSES AS NEEDED TABLET, FILM COATED ORAL TWICE A DAY
Status: ON HOLD | COMMUNITY

## 2024-07-18 RX ORDER — POTASSIUM CHLORIDE 10 MEQ/1
1 TABLET WITH FOOD TABLET, FILM COATED, EXTENDED RELEASE ORAL TWICE A DAY
Status: ACTIVE | COMMUNITY

## 2024-07-18 RX ORDER — BUSPIRONE HYDROCHLORIDE 15 MG/1
1 TABLET TABLET ORAL TWICE A DAY
Status: ACTIVE | COMMUNITY

## 2024-07-18 RX ORDER — ESTRADIOL 0.1 MG/G
AS DIRECTED CREAM VAGINAL
Status: ACTIVE | COMMUNITY

## 2024-07-18 RX ORDER — DOCUSATE SODIUM 100 MG/1
1 CAPSULE AS NEEDED CAPSULE, LIQUID FILLED ORAL ONCE A DAY
Status: ACTIVE | COMMUNITY

## 2024-07-18 RX ORDER — POLYETHYLENE GLYCOL 1450
AS DIRECTED POWDER (GRAM) MISCELLANEOUS
Status: ACTIVE | COMMUNITY

## 2024-08-13 ENCOUNTER — CLAIMS CREATED FROM THE CLAIM WINDOW (OUTPATIENT)
Dept: URBAN - METROPOLITAN AREA SURGERY CENTER 25 | Facility: SURGERY CENTER | Age: 74
End: 2024-08-13
Payer: MEDICARE

## 2024-08-13 ENCOUNTER — CLAIMS CREATED FROM THE CLAIM WINDOW (OUTPATIENT)
Dept: URBAN - METROPOLITAN AREA CLINIC 4 | Facility: CLINIC | Age: 74
End: 2024-08-13
Payer: MEDICARE

## 2024-08-13 ENCOUNTER — CLAIMS CREATED FROM THE CLAIM WINDOW (OUTPATIENT)
Dept: URBAN - METROPOLITAN AREA SURGERY CENTER 25 | Facility: SURGERY CENTER | Age: 74
End: 2024-08-13

## 2024-08-13 DIAGNOSIS — Z09 ENCNTR FOR F/U EXAM AFT TRTMT FOR COND OTH THAN MALIG NEOPLM: ICD-10-CM

## 2024-08-13 DIAGNOSIS — Z86.010 ADENOMAS PERSONAL HISTORY OF COLONIC POLYPS: ICD-10-CM

## 2024-08-13 DIAGNOSIS — D12.4 ADENOMA OF DESCENDING COLON: ICD-10-CM

## 2024-08-13 DIAGNOSIS — K57.30 COLON, DIVERTICULOSIS: ICD-10-CM

## 2024-08-13 DIAGNOSIS — Z86.010 HISTORY OF ADENOMATOUS COLON POLYPS: ICD-10-CM

## 2024-08-13 DIAGNOSIS — K63.5 BENIGN COLON POLYPS: ICD-10-CM

## 2024-08-13 DIAGNOSIS — D12.4 BENIGN NEOPLASM OF DESCENDING COLON: ICD-10-CM

## 2024-08-13 PROCEDURE — 00811 ANES LWR INTST NDSC NOS: CPT | Performed by: NURSE ANESTHETIST, CERTIFIED REGISTERED

## 2024-08-13 PROCEDURE — 0529F INTRVL 3/>YR PTS CLNSCP DOCD: CPT | Performed by: INTERNAL MEDICINE

## 2024-08-13 PROCEDURE — 45385 COLONOSCOPY W/LESION REMOVAL: CPT | Performed by: INTERNAL MEDICINE

## 2024-08-13 PROCEDURE — 88305 TISSUE EXAM BY PATHOLOGIST: CPT | Performed by: PATHOLOGY

## 2024-08-13 PROCEDURE — 00811 ANES LWR INTST NDSC NOS: CPT | Performed by: ANESTHESIOLOGY

## 2024-08-13 RX ORDER — CHOLECALCIFEROL (VITAMIN D3) 50 MCG
1 TABLET TABLET ORAL ONCE A DAY
Refills: 0 | Status: ACTIVE | COMMUNITY

## 2024-08-13 RX ORDER — PANTOPRAZOLE SODIUM 40 MG/1
TAKE ONE TABLET BY MOUTH DAILY TABLET, DELAYED RELEASE ORAL
Status: ACTIVE | COMMUNITY

## 2024-08-13 RX ORDER — FAMOTIDINE 40 MG/1
1 TABLET AT BEDTIME TABLET, FILM COATED ORAL ONCE A DAY
Qty: 90 TABLET | Refills: 3 | Status: ACTIVE | COMMUNITY
Start: 2024-06-27

## 2024-08-13 RX ORDER — LEVOTHYROXINE SODIUM 88 UG/1
1 TABLET IN THE MORNING ON AN EMPTY STOMACH CAPSULE ORAL ONCE A DAY
Refills: 0 | Status: ACTIVE | COMMUNITY
Start: 2019-03-15

## 2024-08-13 RX ORDER — BUSPIRONE HYDROCHLORIDE 15 MG/1
1 TABLET TABLET ORAL TWICE A DAY
Status: ACTIVE | COMMUNITY

## 2024-08-13 RX ORDER — POTASSIUM CHLORIDE 10 MEQ/1
1 TABLET WITH FOOD TABLET, FILM COATED, EXTENDED RELEASE ORAL TWICE A DAY
Status: ACTIVE | COMMUNITY

## 2024-08-13 RX ORDER — ESTRADIOL 0.1 MG/G
AS DIRECTED CREAM VAGINAL
Status: ACTIVE | COMMUNITY

## 2024-08-13 RX ORDER — PAROXETINE HYDROCHLORIDE 30 MG/1
TAKE ONE TABLET BY MOUTH EVERY MORNING TABLET ORAL
Qty: 90 | Refills: 0 | Status: ACTIVE | COMMUNITY
Start: 2019-09-24

## 2024-08-13 RX ORDER — DOCUSATE SODIUM 100 MG/1
1 CAPSULE AS NEEDED CAPSULE, LIQUID FILLED ORAL ONCE A DAY
Status: ACTIVE | COMMUNITY

## 2024-08-13 RX ORDER — EZETIMIBE 10 MG/1
1 TABLET TABLET ORAL ONCE A DAY
Status: ON HOLD | COMMUNITY

## 2024-08-13 RX ORDER — POLYETHYLENE GLYCOL 1450
AS DIRECTED POWDER (GRAM) MISCELLANEOUS
Status: ACTIVE | COMMUNITY

## 2024-08-13 RX ORDER — MULTIVITAMIN WITH IRON
TAKE 1 TABLET DAILY TABLET ORAL
Refills: 0 | Status: ON HOLD | COMMUNITY

## 2024-08-13 RX ORDER — VALSARTAN 80 MG/1
1 TABLET TABLET, FILM COATED ORAL ONCE A DAY
Status: ACTIVE | COMMUNITY

## 2024-08-13 RX ORDER — ALPRAZOLAM 0.5 MG/1
1 TABLET TABLET ORAL TWICE A DAY
Status: ON HOLD | COMMUNITY

## 2024-08-13 RX ORDER — ONDANSETRON 4 MG/1
1 TABLET ON THE TONGUE AND ALLOW TO DISSOLVE TABLET, ORALLY DISINTEGRATING ORAL
Qty: 30 | Refills: 0 | Status: ACTIVE | COMMUNITY
Start: 2024-06-27

## 2024-08-13 RX ORDER — HYDROCHLOROTHIAZIDE 50 MG/1
TAKE 1 /2 TABLET DAILY TABLET ORAL ONCE A DAY
Refills: 0 | Status: ACTIVE | COMMUNITY
Start: 2019-03-15

## 2024-08-13 RX ORDER — FLUTICASONE PROPIONATE 50 UG/1
1 SPRAY IN EACH NOSTRIL SPRAY, METERED NASAL ONCE A DAY
Status: ACTIVE | COMMUNITY

## 2024-08-13 RX ORDER — SUMATRIPTAN SUCCINATE 100 MG/1
1 TABLET AT LEAST 2 HOURS BETWEEN DOSES AS NEEDED TABLET, FILM COATED ORAL TWICE A DAY
Status: ON HOLD | COMMUNITY

## 2024-09-24 ENCOUNTER — OFFICE VISIT (OUTPATIENT)
Dept: URBAN - METROPOLITAN AREA CLINIC 113 | Facility: CLINIC | Age: 74
End: 2024-09-24
Payer: MEDICARE

## 2024-09-24 VITALS
SYSTOLIC BLOOD PRESSURE: 148 MMHG | BODY MASS INDEX: 31.61 KG/M2 | HEART RATE: 72 BPM | DIASTOLIC BLOOD PRESSURE: 94 MMHG | RESPIRATION RATE: 14 BRPM | WEIGHT: 178.4 LBS | TEMPERATURE: 97.3 F | HEIGHT: 63 IN

## 2024-09-24 DIAGNOSIS — K22.70 BARRETT'S ESOPHAGUS WITHOUT DYSPLASIA: ICD-10-CM

## 2024-09-24 DIAGNOSIS — R11.0 CHRONIC NAUSEA: ICD-10-CM

## 2024-09-24 DIAGNOSIS — D12.6 TUBULAR ADENOMA OF COLON: ICD-10-CM

## 2024-09-24 DIAGNOSIS — R10.11 RUQ PAIN: ICD-10-CM

## 2024-09-24 DIAGNOSIS — K21.9 CHRONIC GERD: ICD-10-CM

## 2024-09-24 PROCEDURE — 99214 OFFICE O/P EST MOD 30 MIN: CPT | Performed by: NURSE PRACTITIONER

## 2024-09-24 RX ORDER — SENNOSIDES, DOCUSATE SODIUM 50; 8.6 MG/1; MG/1
1 TABLET AS NEEDED TABLET, FILM COATED ORAL TWICE A DAY
Status: ACTIVE | COMMUNITY

## 2024-09-24 RX ORDER — ALPRAZOLAM 0.5 MG/1
1 TABLET TABLET ORAL TWICE A DAY
Status: ON HOLD | COMMUNITY

## 2024-09-24 RX ORDER — PAROXETINE HYDROCHLORIDE 30 MG/1
TAKE ONE TABLET BY MOUTH EVERY MORNING TABLET ORAL
Qty: 90 | Refills: 0 | Status: ACTIVE | COMMUNITY
Start: 2019-09-24

## 2024-09-24 RX ORDER — VALSARTAN 80 MG/1
1 TABLET TABLET, FILM COATED ORAL ONCE A DAY
Status: ACTIVE | COMMUNITY

## 2024-09-24 RX ORDER — CHOLECALCIFEROL (VITAMIN D3) 50 MCG
1 TABLET TABLET ORAL ONCE A DAY
Refills: 0 | Status: ACTIVE | COMMUNITY

## 2024-09-24 RX ORDER — HYDROCHLOROTHIAZIDE 50 MG/1
TAKE 1 /2 TABLET DAILY TABLET ORAL ONCE A DAY
Refills: 0 | Status: ACTIVE | COMMUNITY
Start: 2019-03-15

## 2024-09-24 RX ORDER — ONDANSETRON 4 MG/1
1 TABLET ON THE TONGUE AND ALLOW TO DISSOLVE TABLET, ORALLY DISINTEGRATING ORAL
Qty: 30 | Refills: 0 | Status: ON HOLD | COMMUNITY
Start: 2024-06-27

## 2024-09-24 RX ORDER — FAMOTIDINE 40 MG/1
1 TABLET AT BEDTIME TABLET, FILM COATED ORAL ONCE A DAY
Qty: 90 TABLET | Refills: 3 | Status: ON HOLD | COMMUNITY
Start: 2024-06-27

## 2024-09-24 RX ORDER — PANTOPRAZOLE SODIUM 40 MG/1
TAKE ONE TABLET BY MOUTH DAILY TABLET, DELAYED RELEASE ORAL
Status: ACTIVE | COMMUNITY

## 2024-09-24 RX ORDER — ONDANSETRON 4 MG/1
1 TABLET ON THE TONGUE AND ALLOW TO DISSOLVE TABLET, ORALLY DISINTEGRATING ORAL
Qty: 30 | Refills: 0 | OUTPATIENT

## 2024-09-24 RX ORDER — POLYETHYLENE GLYCOL 1450
AS DIRECTED POWDER (GRAM) MISCELLANEOUS
Status: ACTIVE | COMMUNITY

## 2024-09-24 RX ORDER — EZETIMIBE 10 MG/1
1 TABLET TABLET ORAL ONCE A DAY
Status: ON HOLD | COMMUNITY

## 2024-09-24 RX ORDER — BUSPIRONE HYDROCHLORIDE 15 MG/1
1 TABLET TABLET ORAL TWICE A DAY
Status: ACTIVE | COMMUNITY

## 2024-09-24 RX ORDER — FAMOTIDINE 40 MG/1
1 TABLET AT BEDTIME TABLET, FILM COATED ORAL ONCE A DAY
Qty: 90 TABLET | Refills: 3 | OUTPATIENT

## 2024-09-24 RX ORDER — MULTIVITAMIN WITH IRON
TAKE 1 TABLET DAILY TABLET ORAL
Refills: 0 | Status: ON HOLD | COMMUNITY

## 2024-09-24 RX ORDER — SUMATRIPTAN SUCCINATE 100 MG/1
1 TABLET AT LEAST 2 HOURS BETWEEN DOSES AS NEEDED TABLET, FILM COATED ORAL TWICE A DAY
Status: ON HOLD | COMMUNITY

## 2024-09-24 RX ORDER — LEVOTHYROXINE SODIUM 88 UG/1
1 TABLET IN THE MORNING ON AN EMPTY STOMACH CAPSULE ORAL ONCE A DAY
Refills: 0 | Status: ACTIVE | COMMUNITY
Start: 2019-03-15

## 2024-09-24 RX ORDER — PANTOPRAZOLE SODIUM 40 MG/1
1 TABLET TABLET, DELAYED RELEASE ORAL ONCE A DAY
Qty: 90 TABLET | Refills: 3 | OUTPATIENT

## 2024-09-24 RX ORDER — ESTRADIOL 0.1 MG/G
AS DIRECTED CREAM VAGINAL
Status: ACTIVE | COMMUNITY

## 2024-09-24 RX ORDER — POTASSIUM CHLORIDE 10 MEQ/1
1 TABLET WITH FOOD TABLET, FILM COATED, EXTENDED RELEASE ORAL TWICE A DAY
Status: ACTIVE | COMMUNITY

## 2024-09-24 RX ORDER — DOCUSATE SODIUM 100 MG/1
1 CAPSULE AS NEEDED CAPSULE, LIQUID FILLED ORAL ONCE A DAY
Status: ACTIVE | COMMUNITY

## 2024-09-24 RX ORDER — FLUTICASONE PROPIONATE 50 UG/1
1 SPRAY IN EACH NOSTRIL SPRAY, METERED NASAL ONCE A DAY
Status: ACTIVE | COMMUNITY

## 2024-09-24 NOTE — HPI-TODAY'S VISIT:
75 yo woman with a history of Squires's esophagus, RUQ pain, nausea and GERD, as well as history of adenomatous colon polyps, presenting for follow up after an EGD and colonoscopy.  She was seen in the office 6/27/24. She was noted to be due for surveillance EGD and surveillance colonoscopy.   Colonoscopy 8/13/24: Good prep. Normal TI. Moderately tortuous colon. Removal of a 5 mm tubular adenoma from the descending colon. Diverticulosis. Nonbleeding internal hemorrhoids.  EGD 7/18/24: Esophageal mucosal changes classified as Squires's stage C0-M0 per Clements criteria s/p biopsies. Regular Z line. Normal mucosa in the entire esophagus. Erythematous mucosa found in the entire stomach s/p biopsies.  A few gastric polyps s/p biopsies. Normal duodenum. Biopsies showed reflux type changes negative for Squires's, H. pylori, intestinal metaplasia, dysplasia or malignancy.  US performed for RUQ pain was unremarkable.  Amylase, lipase and CBC were WNL. AST 38, ALT 63; otherwise normal CMP.  She continues to not feel good all of the time. She is continuing to experience pain in her back all along her spine, right rib cage, right shoulder, etc. Her arm feels tired at times. She tells me that she needs to have surger on her spine in three different places: cervical spine, thoracolumbar spine and lower back. She is following with Dr. Vince Ramirez.   She has chronic headaches and chronic fatigue. She has had some elevated blood pressures at times.  She has had some increased stress related to recent travel woes, i.e. lost luggage. Her biggest complaint today is back pain and leg pain. No nausea or vomiting. No changes in bowel habits.